# Patient Record
Sex: FEMALE | Race: WHITE | NOT HISPANIC OR LATINO | Employment: FULL TIME | URBAN - METROPOLITAN AREA
[De-identification: names, ages, dates, MRNs, and addresses within clinical notes are randomized per-mention and may not be internally consistent; named-entity substitution may affect disease eponyms.]

---

## 2017-07-11 ENCOUNTER — OFFICE VISIT (OUTPATIENT)
Dept: OBGYN | Facility: CLINIC | Age: 52
End: 2017-07-11
Payer: COMMERCIAL

## 2017-07-11 VITALS — SYSTOLIC BLOOD PRESSURE: 118 MMHG | WEIGHT: 213 LBS | BODY MASS INDEX: 31.45 KG/M2 | DIASTOLIC BLOOD PRESSURE: 86 MMHG

## 2017-07-11 DIAGNOSIS — R39.15 URGENCY OF URINATION: ICD-10-CM

## 2017-07-11 DIAGNOSIS — R35.0 FREQUENCY OF MICTURITION: ICD-10-CM

## 2017-07-11 DIAGNOSIS — R30.0 DYSURIA: Primary | ICD-10-CM

## 2017-07-11 LAB
ALBUMIN UR-MCNC: NEGATIVE MG/DL
APPEARANCE UR: CLEAR
BILIRUB UR QL STRIP: NEGATIVE
COLOR UR AUTO: YELLOW
GLUCOSE UR STRIP-MCNC: NEGATIVE MG/DL
HGB UR QL STRIP: NEGATIVE
KETONES UR STRIP-MCNC: NEGATIVE MG/DL
LEUKOCYTE ESTERASE UR QL STRIP: NEGATIVE
NITRATE UR QL: NEGATIVE
PH UR STRIP: 6.5 PH (ref 5–7)
RBC #/AREA URNS AUTO: NORMAL /HPF (ref 0–2)
SP GR UR STRIP: 1.01 (ref 1–1.03)
URN SPEC COLLECT METH UR: NORMAL
UROBILINOGEN UR STRIP-ACNC: 0.2 EU/DL (ref 0.2–1)
WBC #/AREA URNS AUTO: NORMAL /HPF (ref 0–2)

## 2017-07-11 PROCEDURE — 87086 URINE CULTURE/COLONY COUNT: CPT | Performed by: OBSTETRICS & GYNECOLOGY

## 2017-07-11 PROCEDURE — 99213 OFFICE O/P EST LOW 20 MIN: CPT | Performed by: OBSTETRICS & GYNECOLOGY

## 2017-07-11 PROCEDURE — 81001 URINALYSIS AUTO W/SCOPE: CPT | Performed by: OBSTETRICS & GYNECOLOGY

## 2017-07-11 RX ORDER — ARIPIPRAZOLE 2 MG/1
TABLET ORAL
Refills: 1 | COMMUNITY
Start: 2016-08-29 | End: 2017-07-11

## 2017-07-11 RX ORDER — LEVOTHYROXINE SODIUM 125 UG/1
TABLET ORAL
Refills: 0 | COMMUNITY
Start: 2017-05-24 | End: 2017-08-14 | Stop reason: DRUGHIGH

## 2017-07-11 RX ORDER — BUPROPION HYDROCHLORIDE 150 MG/1
TABLET ORAL
Refills: 1 | COMMUNITY
Start: 2016-09-20 | End: 2017-07-11

## 2017-07-11 RX ORDER — SERTRALINE HYDROCHLORIDE 100 MG/1
TABLET, FILM COATED ORAL
Refills: 2 | COMMUNITY
Start: 2017-05-21 | End: 2017-07-11

## 2017-07-11 RX ORDER — CIPROFLOXACIN 500 MG/1
500 TABLET, FILM COATED ORAL 2 TIMES DAILY
Qty: 14 TABLET | Refills: 1 | Status: SHIPPED | OUTPATIENT
Start: 2017-07-11 | End: 2017-08-14

## 2017-07-11 RX ORDER — PHENAZOPYRIDINE HYDROCHLORIDE 100 MG/1
200 TABLET, FILM COATED ORAL 3 TIMES DAILY PRN
Qty: 12 TABLET | Refills: 1 | Status: SHIPPED | OUTPATIENT
Start: 2017-07-11 | End: 2017-08-14

## 2017-07-11 NOTE — MR AVS SNAPSHOT
"              After Visit Summary   7/11/2017    Mariann Sullivan    MRN: [id number]           Patient Information     Date Of Birth          1965        Visit Information        Provider Department      7/11/2017 10:15 AM Viktor Hendrickson MD [facility][1]        Today's Diagnoses     Dysuria    -  1    Urgency of urination        Frequency of micturition           Follow-ups after your visit        Your next 10 appointments already scheduled     Aug 09, 2017  9:30 AM CDT   MA SCREENING DIGITAL BILATERAL with WEMA1   [facility][1] ([facility][1])    [address] [place] [address], [address]  [facility] [id number] [phone]           Do not use any powder, lotion or deodorant under your arms or on your breast. If you do, we will ask you to remove it before your exam.  Wear comfortable, two-piece clothing.  If you have any allergies, tell your care team.  Bring any previous mammograms from other facilities or have them mailed to the breast center. Three-dimensional (3D) mammograms are available at [place] locations in [facility] and [place]. Benefits of 3D mammograms include: - Improved rate of cancer detection - Decreases your chance of having to go back for more tests, which means fewer: - \"False-positive\" results (This means that there is an abnormal area but it isn't cancer.) - Invasive testing procedures, such as a biopsy or surgery - Can provide clearer images of the breast if you have dense breast tissue. 3D mammography is an optional exam that anyone can have with a 2D mammogram. It doesn't replace or take the place of a 2D mammogram. 2D mammograms remain an effective screening test for all women.  Not all insurance companies cover the cost of a 3D mammogram. Check with your insurance.            Aug 09, 2017  9:45 AM CDT   PHYSICAL with Viktor Hendrickson MD   [facility] ([facility]" "[place])    [address] [address]  [place] [id number] [phone]              Who to contact     If you have questions or need follow up information about today's clinic visit or your schedule please contact [facility] [place] directly at [phone].  Normal or non-critical lab and imaging results will be communicated to you by MyChart, letter or phone within 4 business days after the clinic has received the results. If you do not hear from us within 7 days, please contact the clinic through MyChart or phone. If you have a critical or abnormal lab result, we will notify you by phone as soon as possible.  Submit refill requests through [facility] or call your pharmacy and they will forward the refill request to us. Please allow 3 business days for your refill to be completed.          Additional Information About Your Visit        MyChart Information     [facility] lets you send messages to your doctor, view your test results, renew your prescriptions, schedule appointments and more. To sign up, go to [email or web address] . Click on \"Log in\" on the left side of the screen, which will take you to the Welcome page. Then click on \"Sign up Now\" on the right side of the page.     You will be asked to enter the access code listed below, as well as some personal information. Please follow the directions to create your username and password.     Your access code is: [email or web address]  Expires: 10/9/2017 10:55 AM     Your access code will  in 90 days. If you need help or a new code, please call your [place] clinic or [phone].        Care EveryWhere ID     This is your Care EveryWhere ID. This could be used by other organizations to access your [place] medical records  [id number]        Your Vitals Were     Breastfeeding? BMI (Body Mass Index)                No 31.45 kg/m2           Blood Pressure from Last 3 Encounters:   17 118/86   16 124/80   16 126/80    Weight from " Last 3 Encounters:   07/11/17 213 lb (96.6 kg)   08/17/16 211 lb (95.7 kg)   08/05/16 209 lb (94.8 kg)              We Performed the Following     UA with Microscopic     Urine Culture Aerobic Bacterial          Today's Medication Changes          These changes are accurate as of: 7/11/17 11:05 AM.  If you have any questions, ask your nurse or doctor.               Start taking these medicines.        Dose/Directions    ciprofloxacin 500 MG tablet   Commonly known as:  CIPRO   Used for:  Dysuria, Urgency of urination, Frequency of micturition   Started by:  Viktor Hendrickson MD        Dose:  500 mg   Take 1 tablet (500 mg) by mouth 2 times daily   Quantity:  14 tablet   Refills:  1       phenazopyridine 100 MG tablet   Commonly known as:  PYRIDIUM   Used for:  Dysuria   Started by:  Viktor Hendrickson MD        Dose:  200 mg   Take 2 tablets (200 mg) by mouth 3 times daily as needed   Quantity:  12 tablet   Refills:  1            Where to get your medicines      These medications were sent to [facility] [address] [place], [place] [id number] [employer] AT [facility] [address] & [facility]  [address] [employer] [address]     Phone:  [phone]     ciprofloxacin 500 MG tablet    phenazopyridine 100 MG tablet                Primary Care Provider Office Phone # Fax #    Liza Alvarez [phone]       [facility] [address]  [facility] [address]        Equal Access to Services     [facility]: Hadii agustín braro Sotommy, waaxda luqadaha, qaybta kaalmada adeegyada, shan giang. So [facility] [phone].    ATENCIÓN: Si habla salvador, tiene a shipley disposición servicios gratuitos de asistencia lingüística. Anastacio al [phone].    We comply with applicable federal civil rights laws and [place] laws. We do not discriminate on the basis of race, color, national origin, age, disability sex, sexual orientation or gender  identity.            Thank you!     Thank you for choosing [facility]  for your care. Our goal is always to provide you with excellent care. Hearing back from our patients is one way we can continue to improve our services. Please take a few minutes to complete the written survey that you may receive in the mail after your visit with us. Thank you!             Your Updated Medication List - Protect others around you: Learn how to safely use, store and throw away your medicines at www.disposemymeds.org.          This list is accurate as of: 7/11/17 11:05 AM.  Always use your most recent med list.                   Brand Name Dispense Instructions for use Diagnosis    ciprofloxacin 500 MG tablet    CIPRO    14 tablet    Take 1 tablet (500 mg) by mouth 2 times daily    Dysuria, Urgency of urination, Frequency of micturition       lamoTRIgine 150 MG tablet    LAMICTAL    90 tablet    Take 1 tablet (150 mg) by mouth daily    Depression with anxiety       levothyroxine 125 MCG tablet    SYNTHROID/LEVOTHROID     TK 1 T PO B SAVI        LORazepam 0.5 MG tablet    ATIVAN          phenazopyridine 100 MG tablet    PYRIDIUM    12 tablet    Take 2 tablets (200 mg) by mouth 3 times daily as needed    Dysuria

## 2017-07-11 NOTE — PROGRESS NOTES
SUBJECTIVE:                                                   Mariann Sullivan is a 52 year old female who presents to clinic today for the following health issue(s):  Patient presents with:  Urinary Problem      HPI:  Patient complains of severe urgency and frequency for the last several weeks.  Feels similar to urinary tract infection she said the past.  She has slight dysuria.    No LMP recorded. Patient has had a hysterectomy..   Patient is not sexually active, .  Using hysterectomy for contraception.    reports that she has never smoked. She has never used smokeless tobacco.    STD testing offered?  Declined    Health maintenance updated:  yes    Today's PHQ-2 Score:   PHQ-2 (  [employer]) 2015   Q1: Little interest or pleasure in doing things 1   Q2: Feeling down, depressed or hopeless 1   PHQ-2 Score 2     Today's PHQ-9 Score:   PHQ-9 SCORE 2016   Total Score 9     Today's GHANSHYAM-7 Score:   GHANSHYAM-7 SCORE 2016   Total Score 3       Problem list and histories reviewed & adjusted, as indicated.  Additional history: as documented.    Patient Active Problem List   Diagnosis     Hypothyroidism     Past Surgical History:   Procedure Laterality Date     C LAPAROSCOPIC SUPRACERVICAL HYSTERECTOMY (SUBTOTAL HYSTERECTOMY), WITH OR  age 32    subtotal hysterectomy     HC THYROID LOBECTOMY,UNILAT  age 37      Social History   Substance Use Topics     Smoking status: Never Smoker     Smokeless tobacco: Never Used     Alcohol use 6.0 oz/week      Comment: wine      Problem (# of Occurrences) Relation (Name,Age of Onset)    Cardiovascular (1) Mother: CHF    DIABETES (1) Father: type 2    Hypertension (1) Mother    Neurologic Disorder (1) Father: Parkinson            Current Outpatient Prescriptions   Medication Sig     levothyroxine (SYNTHROID/LEVOTHROID) 125 MCG tablet TK 1 T PO B SAVI     ciprofloxacin (CIPRO) 500 MG tablet Take 1 tablet (500 mg) by mouth 2 times daily     phenazopyridine (PYRIDIUM) 100  MG tablet Take 2 tablets (200 mg) by mouth 3 times daily as needed     lamoTRIgine (LAMICTAL) 150 MG tablet Take 1 tablet (150 mg) by mouth daily     LORazepam (ATIVAN) 0.5 MG tablet      [DISCONTINUED] levothyroxine (SYNTHROID) 175 MCG tablet Take 1 tablet (175 mcg) by mouth daily     No current facility-administered medications for this visit.      Allergies   Allergen Reactions     Sulfa Drugs        ROS:  12 point review of systems negative other than symptoms noted below.  Constitutional: Loss of Appetite and Weight Loss  Cardiovascular: Lower Extremity Swelling and Palpitations  Respiratory: Cough  Gastrointestinal: Abdominal Pain, Bloating, Nausea and Vomiting  Genitourinary: Hot Flashes, Incontinence, Night Sweats, Pelvic Pain and Urgency  Endocrine: Cold Intolerance and Loss of Hair  Psychiatric: Anxiety and Depression    OBJECTIVE:     /86  Wt 213 lb (96.6 kg)  Breastfeeding? No  BMI 31.45 kg/m2  Body mass index is 31.45 kg/(m^2).    Exam:  Constitutional:  Appearance: Well nourished, well developed alert, in no acute distress  Gastrointestinal:  Abdominal Examination:  Abdomen nontender to palpation, tone normal without rigidity or guarding, no masses present, umbilicus without lesions; Liver/Spleen:  No hepatomegaly present, liver nontender to palpation; Hernias:  No hernias present  Lymphatic: Lymph Nodes:  No other lymphadenopathy present  Skin:General Inspection:  No rashes present, no lesions present, no areas of discoloration; Genitalia and Groin:  No rashes present, no lesions present, no areas of discoloration, no masses present.  Neurologic/Psychiatric:  Mental Status:  Oriented X3   Pelvic Exam:  External Genitalia:     Normal appearance for age, no discharge present, no tenderness present, no inflammatory lesions present, color normal  Vagina:     Normal vaginal vault without central or paravaginal defects, no discharge present, no inflammatory lesions present, no masses  present  Bladder:     Tender to palpation  Perineum:     Perineum within normal limits, no evidence of trauma, no rashes or skin lesions present  Anus:     Anus within normal limits, no hemorrhoids present  Inguinal Lymph Nodes:     No lymphadenopathy present  Pubic Hair:     Normal pubic hair distribution for age  Genitalia and Groin:     No rashes present, no lesions present, no areas of discoloration, no masses present     In-Clinic Test Results:  Results for orders placed or performed in visit on 07/11/17 (from the past 24 hour(s))   UA with Microscopic   Result Value Ref Range    Color Urine Yellow     Appearance Urine Clear     Glucose Urine Negative NEG mg/dL    Bilirubin Urine Negative NEG    Ketones Urine Negative NEG mg/dL    Specific Gravity Urine 1.010 1.003 - 1.035    pH Urine 6.5 5.0 - 7.0 pH    Protein Albumin Urine Negative NEG mg/dL    Urobilinogen Urine 0.2 0.2 - 1.0 EU/dL    Nitrite Urine Negative NEG    Blood Urine Negative NEG    Leukocyte Esterase Urine Negative NEG    Source Midstream Urine     WBC Urine O - 2 0 - 2 /HPF    RBC Urine O - 2 0 - 2 /HPF       ASSESSMENT/PLAN:                                                        ICD-10-CM    1. Dysuria R30.0 UA with Microscopic     Urine Culture Aerobic Bacterial     ciprofloxacin (CIPRO) 500 MG tablet     phenazopyridine (PYRIDIUM) 100 MG tablet   2. Urgency of urination R39.15 ciprofloxacin (CIPRO) 500 MG tablet   3. Frequency of micturition R35.0 ciprofloxacin (CIPRO) 500 MG tablet           Plan: The patient is not improved in 48-72 hours she will contact clinic.  We will notify her so the results of her urine culture.    Viktor Hendrickson MD  [facility]

## 2017-07-12 LAB
BACTERIA SPEC CULT: NORMAL
Lab: NORMAL
MICRO REPORT STATUS: NORMAL
SPECIMEN SOURCE: NORMAL

## 2017-08-14 ENCOUNTER — RADIANT APPOINTMENT (OUTPATIENT)
Dept: MAMMOGRAPHY | Facility: CLINIC | Age: 52
End: 2017-08-14
Attending: OBSTETRICS & GYNECOLOGY
Payer: COMMERCIAL

## 2017-08-14 ENCOUNTER — OFFICE VISIT (OUTPATIENT)
Dept: OBGYN | Facility: CLINIC | Age: 52
End: 2017-08-14
Attending: OBSTETRICS & GYNECOLOGY
Payer: COMMERCIAL

## 2017-08-14 VITALS
DIASTOLIC BLOOD PRESSURE: 76 MMHG | BODY MASS INDEX: 31.84 KG/M2 | WEIGHT: 215 LBS | HEIGHT: 69 IN | SYSTOLIC BLOOD PRESSURE: 110 MMHG

## 2017-08-14 DIAGNOSIS — N39.46 MIXED INCONTINENCE URGE AND STRESS: ICD-10-CM

## 2017-08-14 DIAGNOSIS — Z01.419 ENCOUNTER FOR GYNECOLOGICAL EXAMINATION WITHOUT ABNORMAL FINDING: Primary | ICD-10-CM

## 2017-08-14 DIAGNOSIS — N95.1 SYMPTOMS, SUCH AS FLUSHING, SLEEPLESSNESS, HEADACHE, LACK OF CONCENTRATION, ASSOCIATED WITH THE MENOPAUSE: ICD-10-CM

## 2017-08-14 DIAGNOSIS — Z11.51 SCREENING FOR HUMAN PAPILLOMAVIRUS: ICD-10-CM

## 2017-08-14 DIAGNOSIS — Z12.31 VISIT FOR SCREENING MAMMOGRAM: ICD-10-CM

## 2017-08-14 DIAGNOSIS — T14.8XXA BRUISING: ICD-10-CM

## 2017-08-14 LAB
BASOPHILS # BLD AUTO: 0 10E9/L (ref 0–0.2)
BASOPHILS NFR BLD AUTO: 0.3 %
DIFFERENTIAL METHOD BLD: NORMAL
EOSINOPHIL # BLD AUTO: 0.1 10E9/L (ref 0–0.7)
EOSINOPHIL NFR BLD AUTO: 1.3 %
ERYTHROCYTE [DISTWIDTH] IN BLOOD BY AUTOMATED COUNT: 14.2 % (ref 10–15)
HCT VFR BLD AUTO: 39.7 % (ref 35–47)
HGB BLD-MCNC: 13.2 G/DL (ref 11.7–15.7)
LYMPHOCYTES # BLD AUTO: 1.9 10E9/L (ref 0.8–5.3)
LYMPHOCYTES NFR BLD AUTO: 25.5 %
MCH RBC QN AUTO: 28.9 PG (ref 26.5–33)
MCHC RBC AUTO-ENTMCNC: 33.2 G/DL (ref 31.5–36.5)
MCV RBC AUTO: 87 FL (ref 78–100)
MONOCYTES # BLD AUTO: 0.4 10E9/L (ref 0–1.3)
MONOCYTES NFR BLD AUTO: 4.9 %
NEUTROPHILS # BLD AUTO: 5.1 10E9/L (ref 1.6–8.3)
NEUTROPHILS NFR BLD AUTO: 68 %
PLATELET # BLD AUTO: 233 10E9/L (ref 150–450)
RBC # BLD AUTO: 4.56 10E12/L (ref 3.8–5.2)
WBC # BLD AUTO: 7.5 10E9/L (ref 4–11)

## 2017-08-14 PROCEDURE — 82670 ASSAY OF TOTAL ESTRADIOL: CPT | Performed by: OBSTETRICS & GYNECOLOGY

## 2017-08-14 PROCEDURE — 99396 PREV VISIT EST AGE 40-64: CPT | Performed by: OBSTETRICS & GYNECOLOGY

## 2017-08-14 PROCEDURE — G0145 SCR C/V CYTO,THINLAYER,RESCR: HCPCS | Performed by: OBSTETRICS & GYNECOLOGY

## 2017-08-14 PROCEDURE — 83001 ASSAY OF GONADOTROPIN (FSH): CPT | Performed by: OBSTETRICS & GYNECOLOGY

## 2017-08-14 PROCEDURE — 36415 COLL VENOUS BLD VENIPUNCTURE: CPT | Performed by: OBSTETRICS & GYNECOLOGY

## 2017-08-14 PROCEDURE — 87624 HPV HI-RISK TYP POOLED RSLT: CPT | Performed by: OBSTETRICS & GYNECOLOGY

## 2017-08-14 PROCEDURE — 85025 COMPLETE CBC W/AUTO DIFF WBC: CPT | Performed by: OBSTETRICS & GYNECOLOGY

## 2017-08-14 PROCEDURE — G0202 SCR MAMMO BI INCL CAD: HCPCS | Mod: TC

## 2017-08-14 RX ORDER — LEVOTHYROXINE SODIUM 100 UG/1
100 CAPSULE ORAL
COMMUNITY
End: 2020-01-20

## 2017-08-14 ASSESSMENT — PATIENT HEALTH QUESTIONNAIRE - PHQ9
SUM OF ALL RESPONSES TO PHQ QUESTIONS 1-9: 7
5. POOR APPETITE OR OVEREATING: SEVERAL DAYS

## 2017-08-14 ASSESSMENT — ANXIETY QUESTIONNAIRES
3. WORRYING TOO MUCH ABOUT DIFFERENT THINGS: SEVERAL DAYS
IF YOU CHECKED OFF ANY PROBLEMS ON THIS QUESTIONNAIRE, HOW DIFFICULT HAVE THESE PROBLEMS MADE IT FOR YOU TO DO YOUR WORK, TAKE CARE OF THINGS AT HOME, OR GET ALONG WITH OTHER PEOPLE: SOMEWHAT DIFFICULT
1. FEELING NERVOUS, ANXIOUS, OR ON EDGE: SEVERAL DAYS
GAD7 TOTAL SCORE: 7
5. BEING SO RESTLESS THAT IT IS HARD TO SIT STILL: SEVERAL DAYS
6. BECOMING EASILY ANNOYED OR IRRITABLE: SEVERAL DAYS
2. NOT BEING ABLE TO STOP OR CONTROL WORRYING: SEVERAL DAYS
7. FEELING AFRAID AS IF SOMETHING AWFUL MIGHT HAPPEN: SEVERAL DAYS

## 2017-08-14 NOTE — PROGRESS NOTES
Mariann is a 52 year old  female who presents for annual exam.     Besides routine health maintenance,  she would like to discuss bruising on legs.    HPI:  Patient seen for her annual exam.  She is not having periods and is wondering if she may be in menopause.  She is having more hot flashes and night sweats.  She also notices some incontinence.  She has urgency as well as incontinence with exercise.  She has to wear a pad.  She also has easy bruising over her arms and legs.  She stopped her Lamictal medication.  She has had no other evaluation.    GYNECOLOGIC HISTORY:    No LMP recorded. Patient has had a hysterectomy. reports that she has never smoked. She has never used smokeless tobacco.  Patient is not sexually active.  STD testing offered?  Declined    Last PHQ-9 score on record=   PHQ-9 SCORE 2017   Total Score 7         HEALTH MAINTENANCE:  Cholesterol:   Cholesterol   Date Value Ref Range Status   2014 149 mg/dL Final   2011 149 mg/dL Final   Last Mammo: 16, Result: normal, Next Mammo: today   Pap:   Lab Results   Component Value Date    PAP NIL 2016    PAP NIL 2015   Colonoscopy: 2010, normal, repeat 10 years  Health maintenance updated:  yes    HISTORY:  Obstetric History       T0      L0     SAB0   TAB0   Ectopic0   Multiple0   Live Births0       # Outcome Date GA Lbr Brando/2nd Weight Sex Delivery Anes PTL Lv   2 Para            1 Para                 Past Medical History:   Diagnosis Date     Anxiety      Depressive disorder      Thyroid disease      Past Surgical History:   Procedure Laterality Date     C LAPAROSCOPIC SUPRACERVICAL HYSTERECTOMY (SUBTOTAL HYSTERECTOMY), WITH OR  age 32    subtotal hysterectomy     HC THYROID LOBECTOMY,UNILAT  age 37     Family History   Problem Relation Age of Onset     DIABETES Father      type 2     Neurologic Disorder Father      Parkinson     Hypertension Mother      Cardiovascular Mother      CHF     Social  "History     Social History     Marital status:      Spouse name: N/A     Number of children: 2     Years of education: N/A     Occupational History      [employer]     Social History Main Topics     Smoking status: Never Smoker     Smokeless tobacco: Never Used     Alcohol use 6.0 oz/week      Comment: wine     Drug use: No     Sexual activity: Yes     Partners: Male     Birth control/ protection: Female Surgical      Comment: hysterectomy     Other Topics Concern      Service No     Blood Transfusions No     Caffeine Concern Yes     2 cups coffee/ 1 can pop     Occupational Exposure Yes     keyboarding     Hobby Hazards No     Sleep Concern Yes     restless     Weight Concern Yes     Special Diet Yes     Exercise Yes     Seat Belt Yes     Self-Exams Yes     Social History Narrative       Current Outpatient Prescriptions:      Levothyroxine Sodium 100 MCG CAPS, Take 100 mcg by mouth, Disp: , Rfl:      Cholecalciferol (VITAMIN D PO), , Disp: , Rfl:      Multiple Vitamins-Minerals (MULTIVITAMIN PO), , Disp: , Rfl:      LORazepam (ATIVAN) 0.5 MG tablet, as needed , Disp: , Rfl: 5     [DISCONTINUED] levothyroxine (SYNTHROID/LEVOTHROID) 125 MCG tablet, TK 1 T PO B SAVI, Disp: , Rfl: 0  Allergies   Allergen Reactions     Sulfa Drugs        Past medical, surgical, social and family history were reviewed and updated in EPIC.    ROS:   12 point review of systems negative other than symptoms noted below.  Constitutional: Fatigue  Head: Nasal Congestion  Cardiovascular: Lower Extremity Swelling  Gastrointestinal: Abdominal Pain and Bloating  Genitourinary: Night Sweats and Urgency  Skin: Skin Dryness  Endocrine: Cold Intolerance and Loss of Hair  Psychiatric: Anxiety and Depression    EXAM:  /76  Ht 5' 9\" (1.753 m)  Wt 215 lb (97.5 kg)  BMI 31.75 kg/m2   BMI: Body mass index is 31.75 kg/(m^2).    EXAM:  Constitutional: Appearance: Well nourished, well developed alert, in no acute " distress  Neck:  Lymph Nodes:  No lymphadenopathy present    Thyroid:  Gland size normal, nontender, no nodules or masses present  on palpation  Chest:  Respiratory Effort:  Breathing unlabored  Cardiovascular:Heart    Auscultation:  Regular rate, normal rhythm, no murmurs present  Breasts: Inspection of Breasts:  No lymphadenopathy present., Palpation of Breasts and Axillae:  No masses present on palpation, no breast tenderness., Axillary Lymph Nodes:  No lymphadenopathy present. and No nodularity, asymmetry or nipple discharge bilaterally.  Gastrointestinal:  Abdominal Examination:  Abdomen nontender to palpation, tone normal without     rigidity or guarding, no masses present, umbilicus without lesions    Liver and speen:  No hepatomegaly present, liver nontender to palpation    Hernias:  No hernias present  Lymphatic: Lymph Nodes:  No other lymphadenopathy present  Skin:  General Inspection:  No rashes present, no lesions present, no areas of  discoloration.    Genitalia and Groin:  No rashes present, no lesions present, no areas of  discoloration, no masses present  Neurologic/Psychiatric:    Mental Status:  Oriented X3     Pelvic Exam:  External Genitalia:     Normal appearance for age, no discharge present, no tenderness present, no inflammatory lesions present, color normal  Vagina:     Normal vaginal vault without central or paravaginal defects, no discharge present, no inflammatory lesions present, no masses present  Bladder:     Nontender to palpation  Urethra:   Urethral Body:  Urethra palpation normal, urethra structural support normal   Urethral Meatus:  No erythema or lesions present  Cervix:     Appearance healthy, no lesions present, nontender to palpation, no bleeding present  Uterus:     Surgically absent  Adnexa:     No adnexal tenderness present, no adnexal masses present  Perineum:     Perineum within normal limits, no evidence of trauma, no rashes or skin lesions present  Anus:     Anus within  normal limits, no hemorrhoids present  Inguinal Lymph Nodes:     No lymphadenopathy present  Pubic Hair:     Normal pubic hair distribution for age  Genitalia and Groin:     No rashes present, no lesions present, no areas of discoloration, no masses present      COUNSELING:   Reviewed preventive health counseling, as reflected in patient instructions       Regular exercise       Healthy diet/nutrition    Body mass index is 31.75 kg/(m^2).  Weight management plan: Patient was referred to their PCP to discuss a diet and exercise plan.    ASSESSMENT:  52 year old female with satisfactory annual exam.    ICD-10-CM    1. Encounter for gynecological examination without abnormal finding Z01.419 Pap imaged thin layer screen reflex to HPV if ASCUS - recommended age 25 - 29 years   2. Bruising T14.8 CBC with platelets differential   3. Mixed incontinence urge and stress N39.46 CYSTOMETROGRAM COMPLEX W VOID & URETH PRESS PROFILE     CYSTOURETHROSCOPY   4. Symptoms, such as flushing, sleeplessness, headache, lack of concentration, associated with the menopause N95.1 Follicle stimulating hormone     Estradiol           Plan: The patient be notified as results of her lab tests.  She would definitely be a candidate for estrogen replacement and possible also a sling procedure.      Viktor Hendrickson MD

## 2017-08-14 NOTE — MR AVS SNAPSHOT
"              After Visit Summary   8/14/2017    Mariann Sullivan    MRN: [id number]           Patient Information     Date Of Birth          1965        Visit Information        Provider Department      8/14/2017 3:30 PM Viktor Hendrickson MD [facility]        Today's Diagnoses     Encounter for gynecological examination without abnormal finding    -  1    Bruising        Mixed incontinence urge and stress        Symptoms, such as flushing, sleeplessness, headache, lack of concentration, associated with the menopause           Follow-ups after your visit        Who to contact     If you have questions or need follow up information about today's clinic visit or your schedule please contact [facility] directly at [phone].  Normal or non-critical lab and imaging results will be communicated to you by MyChart, letter or phone within 4 business days after the clinic has received the results. If you do not hear from us within 7 days, please contact the clinic through [employer] or phone. If you have a critical or abnormal lab result, we will notify you by phone as soon as possible.  Submit refill requests through Ygle or call your pharmacy and they will forward the refill request to us. Please allow 3 business days for your refill to be completed.          Additional Information About Your Visit        MyChart Information     Ygle lets you send messages to your doctor, view your test results, renew your prescriptions, schedule appointments and more. To sign up, go to [email or web address] . Click on \"Log in\" on the left side of the screen, which will take you to the Welcome page. Then click on \"Sign up Now\" on the right side of the page.     You will be asked to enter the access code listed below, as well as some personal information. Please follow the directions to create your username and password.     Your access code is: [email or web address]  Expires: 10/9/2017 " "10:55 AM     Your access code will  in 90 days. If you need help or a new code, please call your [facility] clinic or [phone].        Care EveryWhere ID     This is your Care EveryWhere ID. This could be used by other organizations to access your [facility] medical records  [id number]        Your Vitals Were     Height BMI (Body Mass Index)                5' 9\" (1.753 m) 31.75 kg/m2           Blood Pressure from Last 3 Encounters:   17 110/76   17 118/86   16 124/80    Weight from Last 3 Encounters:   17 215 lb (97.5 kg)   17 213 lb (96.6 kg)   16 211 lb (95.7 kg)              We Performed the Following     CBC with platelets differential     CYSTOMETROGRAM COMPLEX W VOID & URETH PRESS PROFILE     CYSTOURETHROSCOPY     Estradiol     Follicle stimulating hormone     Pap imaged thin layer screen reflex to HPV if ASCUS - recommended age 25 - 29 years          Today's Medication Changes          These changes are accurate as of: 17  4:25 PM.  If you have any questions, ask your nurse or doctor.               These medicines have changed or have updated prescriptions.        Dose/Directions    Levothyroxine Sodium 100 MCG Caps   This may have changed:  Another medication with the same name was removed. Continue taking this medication, and follow the directions you see here.   Changed by:  Viktor Hendrickson MD        Dose:  100 mcg   Take 100 mcg by mouth   Refills:  0                Primary Care Provider Office Phone # Fax #    Liza Alvarez [phone]       [facility] [address] [address]        Equal Access to Services     [facility]: Hadii agustín chavez hadasho Soomaali, waaxda luqadaha, qaybta kaalmada andreegyada, shan giang. So [facility] [phone].    ATENCIÓN: Si habla español, tiene a shipley disposición servicios gratuitos de asistencia lingüística. Llame al [phone].    We comply with applicable " federal civil rights laws and [place] laws. We do not discriminate on the basis of race, color, national origin, age, disability sex, sexual orientation or gender identity.            Thank you!     Thank you for choosing [facility]  for your care. Our goal is always to provide you with excellent care. Hearing back from our patients is one way we can continue to improve our services. Please take a few minutes to complete the written survey that you may receive in the mail after your visit with us. Thank you!             Your Updated Medication List - Protect others around you: Learn how to safely use, store and throw away your medicines at www.disposemymeds.org.          This list is accurate as of: 8/14/17  4:25 PM.  Always use your most recent med list.                   Brand Name Dispense Instructions for use Diagnosis    Levothyroxine Sodium 100 MCG Caps      Take 100 mcg by mouth        LORazepam 0.5 MG tablet    ATIVAN     as needed        MULTIVITAMIN PO           VITAMIN D PO

## 2017-08-14 NOTE — LETTER
August 26, 2017    [facility] [address]    Dear Mariann,  We are happy to inform you that your PAP smear result from 8/14/17 is normal.  We are now able to do a follow up test on PAP smears. The DNA test is for HPV (Human Papilloma Virus). Cervical cancer is closely linked with certain types of HPV. Your result showed no evidence of high risk HPV.  Therefore we recommend you return in 3 years for your next pap smear and HPV test.  You will still need to return to the clinic every year for an annual exam and other preventive tests.  Please contact the clinic at [phone] with any questions.  Sincerely,    Viktor Hendrickson MD/deidra

## 2017-08-15 LAB
ESTRADIOL SERPL-MCNC: 20 PG/ML
FSH SERPL-ACNC: 79.2 IU/L

## 2017-08-15 ASSESSMENT — ANXIETY QUESTIONNAIRES: GAD7 TOTAL SCORE: 7

## 2017-08-17 LAB
COPATH REPORT: NORMAL
PAP: NORMAL

## 2017-08-21 ENCOUNTER — APPOINTMENT (OUTPATIENT)
Dept: OBGYN | Facility: CLINIC | Age: 52
End: 2017-08-21
Payer: COMMERCIAL

## 2017-08-21 ENCOUNTER — OFFICE VISIT (OUTPATIENT)
Dept: OBGYN | Facility: CLINIC | Age: 52
End: 2017-08-21
Payer: COMMERCIAL

## 2017-08-21 DIAGNOSIS — N39.46 MIXED STRESS AND URGE URINARY INCONTINENCE: Primary | ICD-10-CM

## 2017-08-21 DIAGNOSIS — N95.1 SYMPTOMS, SUCH AS FLUSHING, SLEEPLESSNESS, HEADACHE, LACK OF CONCENTRATION, ASSOCIATED WITH THE MENOPAUSE: ICD-10-CM

## 2017-08-21 LAB
FINAL DIAGNOSIS: NORMAL
HPV HR 12 DNA CVX QL NAA+PROBE: NEGATIVE
HPV16 DNA SPEC QL NAA+PROBE: NEGATIVE
HPV18 DNA SPEC QL NAA+PROBE: NEGATIVE
SPECIMEN DESCRIPTION: NORMAL

## 2017-08-21 PROCEDURE — 52000 CYSTOURETHROSCOPY: CPT | Performed by: OBSTETRICS & GYNECOLOGY

## 2017-08-21 PROCEDURE — 51729 CYSTOMETROGRAM W/VP&UP: CPT | Performed by: OBSTETRICS & GYNECOLOGY

## 2017-08-21 RX ORDER — NORETHINDRONE ACETATE AND ETHINYL ESTRADIOL 1; 5 MG/1; UG/1
1 TABLET ORAL DAILY
Qty: 90 TABLET | Refills: 3 | Status: SHIPPED | OUTPATIENT
Start: 2017-08-21 | End: 2018-12-24

## 2017-08-21 NOTE — MR AVS SNAPSHOT
"              After Visit Summary   2017    Mariann Sullivan    MRN: [id number]           Patient Information     Date Of Birth          1965        Visit Information        Provider Department      2017 3:00 PM Viktor Hendrickson MD [facility]        Today's Diagnoses     Mixed stress and urge urinary incontinence    -  1    Symptoms, such as flushing, sleeplessness, headache, lack of concentration, associated with the menopause           Follow-ups after your visit        Who to contact     If you have questions or need follow up information about today's clinic visit or your schedule please contact [facility] directly at [phone].  Normal or non-critical lab and imaging results will be communicated to you by MyChart, letter or phone within 4 business days after the clinic has received the results. If you do not hear from us within 7 days, please contact the clinic through MyChart or phone. If you have a critical or abnormal lab result, we will notify you by phone as soon as possible.  Submit refill requests through [employer] or call your pharmacy and they will forward the refill request to us. Please allow 3 business days for your refill to be completed.          Additional Information About Your Visit        MyChart Information     [employer]igm lets you send messages to your doctor, view your test results, renew your prescriptions, schedule appointments and more. To sign up, go to [email or web address] . Click on \"Log in\" on the left side of the screen, which will take you to the Welcome page. Then click on \"Sign up Now\" on the right side of the page.     You will be asked to enter the access code listed below, as well as some personal information. Please follow the directions to create your username and password.     Your access code is: [email or web address]  Expires: 10/9/2017 10:55 AM     Your access code will  in 90 days. If you need help or a new " code, please call your [facility] clinic or [phone].        Care EveryWhere ID     This is your Care EveryWhere ID. This could be used by other organizations to access your [facility] medical records  [id number]         Blood Pressure from Last 3 Encounters:   08/14/17 110/76   07/11/17 118/86   08/17/16 124/80    Weight from Last 3 Encounters:   08/14/17 215 lb (97.5 kg)   07/11/17 213 lb (96.6 kg)   08/17/16 211 lb (95.7 kg)              We Performed the Following     CYSTOMETROGRAM COMPLEX W VOID & URETH PRESS PROFILE     CYSTOURETHROSCOPY          Today's Medication Changes          These changes are accurate as of: 8/21/17  3:30 PM.  If you have any questions, ask your nurse or doctor.               Start taking these medicines.        Dose/Directions    norethindrone-ethinyl estradiol 1-5 MG-MCG per tablet   Commonly known as:  FEMHRT 1/5   Used for:  Symptoms, such as flushing, sleeplessness, headache, lack of concentration, associated with the menopause   Started by:  Viktor Hendrickson MD        Dose:  1 tablet   Take 1 tablet by mouth daily   Quantity:  90 tablet   Refills:  3            Where to get your medicines      These medications were sent to [facility] [address] [place] [address] [facility] AT [place] [address] & [facility]  [address] [facility] [address]     Phone:  [phone]     norethindrone-ethinyl estradiol 1-5 MG-MCG per tablet                Primary Care Provider Office Phone # Fax #    Liza Alvarez [phone] [address] [address]        Equal Access to Services     [facility]: Hadjihan Aguila, ar franco, mimi lopez, shan giang. So [facility] [phone].    ATENCIÓN: Si habla español, tiene a shipley disposición servicios gratuitos de asistencia lingüística. Llame al [phone].    We comply with applicable federal civil rights laws  and [place] laws. We do not discriminate on the basis of race, color, national origin, age, disability sex, sexual orientation or gender identity.            Thank you!     Thank you for choosing [facility]  for your care. Our goal is always to provide you with excellent care. Hearing back from our patients is one way we can continue to improve our services. Please take a few minutes to complete the written survey that you may receive in the mail after your visit with us. Thank you!             Your Updated Medication List - Protect others around you: Learn how to safely use, store and throw away your medicines at www.disposemymeds.org.          This list is accurate as of: 8/21/17  3:30 PM.  Always use your most recent med list.                   Brand Name Dispense Instructions for use Diagnosis    Levothyroxine Sodium 100 MCG Caps      Take 100 mcg by mouth        LORazepam 0.5 MG tablet    ATIVAN     as needed        MULTIVITAMIN PO           norethindrone-ethinyl estradiol 1-5 MG-MCG per tablet    FEMHRT 1/5    90 tablet    Take 1 tablet by mouth daily    Symptoms, such as flushing, sleeplessness, headache, lack of concentration, associated with the menopause       VITAMIN D PO

## 2017-08-21 NOTE — PROGRESS NOTES
See scanned Urodynamics report.    Patient is seen for a cystourethroscopy.  She was recently seen for annual exam.  Patient complained of both stress incontinence and urgency.  She has to wear a pad on a daily basis.  She has just completed her urodynamics which showed a mixed urinary stress and urge incontinence.    Preoperative diagnosis: Mixed urge and stress incontinence.    Postoperative diagnosis: Same    Procedure: Cystourethroscopy.    Findings: The patient had just completed her urodynamics.  Lidocaine gel was in the urethra.  The patient was placed in the lithotomy position.  Using the Olympus flexible cystourethroscope the scope was inserted into the urethra and through the bladder neck.  The bladder was surveyed and appeared normal.  There seemed to be some increased vascularity within the bladder but there was no evidence of pathology.  Ureteral orifices appeared normal.  The cystourethroscope was withdrawn through the bladder neck which closed normally.  The urethra appeared normal.  The patient tolerated the procedure well.    Plan: The patient will be scheduled for a suburethral sling to be done as an outpatient under general anesthesia.  Prior to the surgery the patient will start on her hormone replacement therapy.  She will also make an appointment with internal medicine to investigate the easy bruisability and for a preop exam.    Viktor Hendrickson MD     Amount of time needed for the procedure:  1 hour    Expected time off from work:  2 days  Surgeon:  Viktor Hendrickson MD  Surgical Procedure:  Suburethral sling, cystoscopy  Preop Needed:  Yes with  PCP  Location for surgery to performed:   Surgery Outpatient  Anesthesia:  General     Allergies   Allergen Reactions     Sulfa Drugs        DIAGNOSIS:  Mixed urge and stress urinary incontinence    Special Instructions:

## 2017-08-23 ENCOUNTER — TELEPHONE (OUTPATIENT)
Dept: OBGYN | Facility: CLINIC | Age: 52
End: 2017-08-23

## 2017-08-23 NOTE — TELEPHONE ENCOUNTER
[facility] to call re SX Chari Lemus  Surgery Scheduler      Amount of time needed for the procedure:  1 hour                                            Expected time off from work:  2 days  Surgeon:  Viktor Hendrickson MD  Surgical Procedure:  Suburethral sling, cystoscopy  Preop Needed:  Yes with  PCP  Location for surgery to performed:   Surgery Outpatient  Anesthesia:  General           Allergies   Allergen Reactions     Sulfa Drugs           DIAGNOSIS:  Mixed urge and stress urinary incontinence     Special Instructions:

## 2017-08-23 NOTE — TELEPHONE ENCOUNTER
Spoke to patient. Advised this is a 2 day recovery. Dr Hendrickson operates any day of the week with his dedicated SX days to be Mondays. His first available Monday would be 9/18/2017. Any other day is dependent on the hospital. Pt stated she would look at her calendar and get back to me. Advised if she does get my vm to leave a couple dates on there, I will call the hospital and then give her a call back. Pt understood    Julita Lemus  Surgery Scheduler

## 2018-12-20 NOTE — PROGRESS NOTES
Mariann is a 53 year old  female who presents for annual exam.     Besides routine health maintenance,  she would like to discuss fatigue and urinary urgency.    HPI:  The patient's PCP is Liza Alvarez MD.    Complains of chronic fatigue.  It has been getting much worse over the past several months.  She also continues to have mixed urinary stress incontinence symptoms.  We had talked about a sling procedure at her last visit.  She has not had time to follow through.      GYNECOLOGIC HISTORY:    No LMP recorded. Patient has had a hysterectomy.  Her current contraception method is: hysterectomy.  She  reports that  has never smoked. she has never used smokeless tobacco.    Patient is not sexually active.  STD testing offered?  Declined  Last PHQ-9 score on record =   PHQ-9 SCORE 2018   PHQ-9 Total Score 7     Last GAD7 score on record =   GHANSHYAM-7 SCORE 2018   Total Score 4     Alcohol Score = 0    HEALTH MAINTENANCE:  Cholesterol:   Cholesterol   Date Value Ref Range Status   2014 149 mg/dL Final   2011 149 mg/dL Final   14    Total= 149, Triglycerides=73, HDL=49, LDL=85  Last Mammo: one year ago, Result: normal, Next Mammo: today   Pap:   Lab Results   Component Value Date    PAP NIL 2017    PAP NIL 2016    PAP NIL 2015 WNL HPV (-)neg  Colonoscopy:  12/17/10, Result: normal, Next Colonoscopy: 2 years.  Dexa:  2016    Health maintenance updated:  yes    HISTORY:  Obstetric History       T2      L2     SAB0   TAB0   Ectopic0   Multiple0   Live Births2       # Outcome Date GA Lbr Brando/2nd Weight Sex Delivery Anes PTL Lv   2 Term         IRINA   1 Term         IRINA          Patient Active Problem List   Diagnosis     Hypothyroidism     Past Surgical History:   Procedure Laterality Date     C LAPAROSCOPIC SUPRACERVICAL HYSTERECTOMY (SUBTOTAL HYSTERECTOMY), WITH OR  age 32    subtotal hysterectomy     HC THYROID LOBECTOMY,UNILAT  age 37     "  Social History     Tobacco Use     Smoking status: Never Smoker     Smokeless tobacco: Never Used   Substance Use Topics     Alcohol use: Yes     Alcohol/week: 6.0 oz     Comment: wine      Problem (# of Occurrences) Relation (Name,Age of Onset)    Cardiovascular (1) Mother: CHF    Diabetes (1) Father: type 2    Hypertension (1) Mother    Neurologic Disorder (1) Father: Parkinson            Current Outpatient Medications   Medication Sig     Cholecalciferol (VITAMIN D PO)      Levothyroxine Sodium 100 MCG CAPS Take 100 mcg by mouth     Multiple Vitamins-Minerals (MULTIVITAMIN PO)      No current facility-administered medications for this visit.      Allergies   Allergen Reactions     Sulfa Drugs        Past medical, surgical, social and family histories were reviewed and updated in EPIC.    ROS:   12 point review of systems negative other than symptoms noted below.  Constitutional: Fatigue  Gastrointestinal: Abdominal Pain and Bloating  Genitourinary: Urgency  Skin: Skin Dryness  Endocrine: Cold Intolerance and Loss of Hair  Psychiatric: Depression and Difficulty Sleeping    EXAM:  /80   Ht 1.765 m (5' 9.5\")   Wt 104.3 kg (230 lb)   Breastfeeding? No   BMI 33.48 kg/m     BMI: Body mass index is 33.48 kg/m .    PHYSICAL EXAM:  Constitutional:  Appearance: Well nourished, well developed, alert, in no acute distress  Neck:  Lymph Nodes:  No lymphadenopathy present    Thyroid:  Gland size normal, nontender, no nodules or masses present  on palpation  Chest:  Respiratory Effort:  Breathing unlabored  Cardiovascular:    Heart: Auscultation:  Regular rate, normal rhythm, no murmurs present  Breasts: Inspection of Breasts:  No lymphadenopathy present., Palpation of Breasts and Axillae:  No masses present on palpation, no breast tenderness., Axillary Lymph Nodes:  No lymphadenopathy present. and No nodularity, asymmetry or nipple discharge bilaterally.  Gastrointestinal:   Abdominal Examination:  Abdomen " nontender to palpation, tone normal without rigidity or guarding, no masses present, umbilicus without lesions   Liver and Spleen:  No hepatomegaly present, liver nontender to palpation    Hernias:  No hernias present  Lymphatic: Lymph Nodes:  No other lymphadenopathy present  Skin:  General Inspection:  No rashes present, no lesions present, no areas of  discoloration    Genitalia and Groin:  No rashes present, no lesions present, no areas of  discoloration, no masses present  Neurologic/Psychiatric:    Mental Status:  Oriented X3     Pelvic Exam:  External Genitalia:     Normal appearance for age, no discharge present, no tenderness present, no inflammatory lesions present, color normal  Vagina:     Normal vaginal vault without central or paravaginal defects, no discharge present, no inflammatory lesions present, no masses present  Bladder:     Nontender to palpation  Urethra:   Urethral Body:  Urethra palpation normal, urethra structural support normal   Urethral Meatus:  No erythema or lesions present  Cervix:     Appearance healthy, no lesions present, nontender to palpation, no bleeding present  Uterus:     Surgically absent  Adnexa:     No adnexal tenderness present, no adnexal masses present  Perineum:     Perineum within normal limits, no evidence of trauma, no rashes or skin lesions present  Anus:     Anus within normal limits, no hemorrhoids present  Inguinal Lymph Nodes:     No lymphadenopathy present  Pubic Hair:     Normal pubic hair distribution for age  Genitalia and Groin:     No rashes present, no lesions present, no areas of discoloration, no masses present      COUNSELING:   Reviewed preventive health counseling, as reflected in patient instructions       Regular exercise       Healthy diet/nutrition    BMI: Body mass index is 33.48 kg/m .  Weight management plan: Patient was referred to their PCP to discuss a diet and exercise plan.    ASSESSMENT:  53 year old female with satisfactory annual  exam.    ICD-10-CM    1. Encounter for gynecological examination without abnormal finding Z01.419    2. Hypothyroidism E03.9    3. Chronic fatigue R53.82 TSH with free T4 reflex     CBC with platelets differential     Comprehensive metabolic panel       PLAN:  Patient will be notified as to the blood results.  She will consider scheduling surgery sometime after the first of the year.    Viktor Hendrickson MD

## 2018-12-24 ENCOUNTER — OFFICE VISIT (OUTPATIENT)
Dept: OBGYN | Facility: CLINIC | Age: 53
End: 2018-12-24
Attending: OBSTETRICS & GYNECOLOGY
Payer: COMMERCIAL

## 2018-12-24 ENCOUNTER — ANCILLARY PROCEDURE (OUTPATIENT)
Dept: MAMMOGRAPHY | Facility: CLINIC | Age: 53
End: 2018-12-24
Attending: OBSTETRICS & GYNECOLOGY
Payer: COMMERCIAL

## 2018-12-24 VITALS
WEIGHT: 230 LBS | SYSTOLIC BLOOD PRESSURE: 120 MMHG | BODY MASS INDEX: 32.93 KG/M2 | DIASTOLIC BLOOD PRESSURE: 80 MMHG | HEIGHT: 70 IN

## 2018-12-24 DIAGNOSIS — Z01.419 ENCOUNTER FOR GYNECOLOGICAL EXAMINATION WITHOUT ABNORMAL FINDING: Primary | ICD-10-CM

## 2018-12-24 DIAGNOSIS — R53.82 CHRONIC FATIGUE: ICD-10-CM

## 2018-12-24 DIAGNOSIS — Z12.31 VISIT FOR SCREENING MAMMOGRAM: ICD-10-CM

## 2018-12-24 LAB
BASOPHILS # BLD AUTO: 0 10E9/L (ref 0–0.2)
BASOPHILS NFR BLD AUTO: 0.5 %
DIFFERENTIAL METHOD BLD: NORMAL
EOSINOPHIL # BLD AUTO: 0.1 10E9/L (ref 0–0.7)
EOSINOPHIL NFR BLD AUTO: 2.4 %
ERYTHROCYTE [DISTWIDTH] IN BLOOD BY AUTOMATED COUNT: 14.5 % (ref 10–15)
HCT VFR BLD AUTO: 39.3 % (ref 35–47)
HGB BLD-MCNC: 12.9 G/DL (ref 11.7–15.7)
LYMPHOCYTES # BLD AUTO: 1.2 10E9/L (ref 0.8–5.3)
LYMPHOCYTES NFR BLD AUTO: 20.6 %
MCH RBC QN AUTO: 28.6 PG (ref 26.5–33)
MCHC RBC AUTO-ENTMCNC: 32.8 G/DL (ref 31.5–36.5)
MCV RBC AUTO: 87 FL (ref 78–100)
MONOCYTES # BLD AUTO: 0.4 10E9/L (ref 0–1.3)
MONOCYTES NFR BLD AUTO: 6.3 %
NEUTROPHILS # BLD AUTO: 4.2 10E9/L (ref 1.6–8.3)
NEUTROPHILS NFR BLD AUTO: 70.2 %
PLATELET # BLD AUTO: 239 10E9/L (ref 150–450)
RBC # BLD AUTO: 4.51 10E12/L (ref 3.8–5.2)
WBC # BLD AUTO: 5.9 10E9/L (ref 4–11)

## 2018-12-24 PROCEDURE — 99396 PREV VISIT EST AGE 40-64: CPT | Performed by: OBSTETRICS & GYNECOLOGY

## 2018-12-24 PROCEDURE — 77067 SCR MAMMO BI INCL CAD: CPT | Mod: TC

## 2018-12-24 PROCEDURE — 85025 COMPLETE CBC W/AUTO DIFF WBC: CPT | Performed by: OBSTETRICS & GYNECOLOGY

## 2018-12-24 PROCEDURE — 84443 ASSAY THYROID STIM HORMONE: CPT | Performed by: OBSTETRICS & GYNECOLOGY

## 2018-12-24 PROCEDURE — 87624 HPV HI-RISK TYP POOLED RSLT: CPT | Performed by: OBSTETRICS & GYNECOLOGY

## 2018-12-24 PROCEDURE — 36415 COLL VENOUS BLD VENIPUNCTURE: CPT | Performed by: OBSTETRICS & GYNECOLOGY

## 2018-12-24 PROCEDURE — 80053 COMPREHEN METABOLIC PANEL: CPT | Performed by: OBSTETRICS & GYNECOLOGY

## 2018-12-24 PROCEDURE — G0145 SCR C/V CYTO,THINLAYER,RESCR: HCPCS | Performed by: OBSTETRICS & GYNECOLOGY

## 2018-12-24 ASSESSMENT — ANXIETY QUESTIONNAIRES
IF YOU CHECKED OFF ANY PROBLEMS ON THIS QUESTIONNAIRE, HOW DIFFICULT HAVE THESE PROBLEMS MADE IT FOR YOU TO DO YOUR WORK, TAKE CARE OF THINGS AT HOME, OR GET ALONG WITH OTHER PEOPLE: SOMEWHAT DIFFICULT
6. BECOMING EASILY ANNOYED OR IRRITABLE: SEVERAL DAYS
5. BEING SO RESTLESS THAT IT IS HARD TO SIT STILL: NOT AT ALL
3. WORRYING TOO MUCH ABOUT DIFFERENT THINGS: SEVERAL DAYS
GAD7 TOTAL SCORE: 4
1. FEELING NERVOUS, ANXIOUS, OR ON EDGE: NOT AT ALL
2. NOT BEING ABLE TO STOP OR CONTROL WORRYING: SEVERAL DAYS
7. FEELING AFRAID AS IF SOMETHING AWFUL MIGHT HAPPEN: SEVERAL DAYS

## 2018-12-24 ASSESSMENT — MIFFLIN-ST. JEOR: SCORE: 1710.58

## 2018-12-24 ASSESSMENT — PATIENT HEALTH QUESTIONNAIRE - PHQ9
5. POOR APPETITE OR OVEREATING: NOT AT ALL
SUM OF ALL RESPONSES TO PHQ QUESTIONS 1-9: 7

## 2018-12-24 NOTE — LETTER
January 3, 2019    [facility] [address]    Dear Mariann,  We are happy to inform you that your PAP smear result from 12/24/18 is normal.  We are now able to do a follow up test on PAP smears. The DNA test is for HPV (Human Papilloma Virus). Cervical cancer is closely linked with certain types of HPV. Your results showed no evidence of high risk HPV.  Therefore we recommend you return in 5 years for your next pap smear and HPV test.  You will still need to return to the clinic every year for an annual exam and other preventive tests.  If you have additional questions regarding this result, please call our registered nurse, Liza at [phone].  Sincerely,    Viktor Hendrickson MD/deidra

## 2018-12-25 LAB
ALBUMIN SERPL-MCNC: 3.6 G/DL (ref 3.4–5)
ALP SERPL-CCNC: 79 U/L (ref 40–150)
ALT SERPL W P-5'-P-CCNC: 38 U/L (ref 0–50)
ANION GAP SERPL CALCULATED.3IONS-SCNC: 8 MMOL/L (ref 3–14)
AST SERPL W P-5'-P-CCNC: 28 U/L (ref 0–45)
BILIRUB SERPL-MCNC: 0.5 MG/DL (ref 0.2–1.3)
BUN SERPL-MCNC: 10 MG/DL (ref 7–30)
CALCIUM SERPL-MCNC: 8.7 MG/DL (ref 8.5–10.1)
CHLORIDE SERPL-SCNC: 105 MMOL/L (ref 94–109)
CO2 SERPL-SCNC: 26 MMOL/L (ref 20–32)
CREAT SERPL-MCNC: 0.82 MG/DL (ref 0.52–1.04)
GFR SERPL CREATININE-BSD FRML MDRD: 80 ML/MIN/{1.73_M2}
GLUCOSE SERPL-MCNC: 84 MG/DL (ref 70–99)
POTASSIUM SERPL-SCNC: 3.8 MMOL/L (ref 3.4–5.3)
PROT SERPL-MCNC: 7.2 G/DL (ref 6.8–8.8)
SODIUM SERPL-SCNC: 139 MMOL/L (ref 133–144)
TSH SERPL DL<=0.005 MIU/L-ACNC: 0.63 MU/L (ref 0.4–4)

## 2018-12-25 ASSESSMENT — ANXIETY QUESTIONNAIRES: GAD7 TOTAL SCORE: 4

## 2018-12-27 LAB
COPATH REPORT: NORMAL
PAP: NORMAL

## 2018-12-31 LAB
FINAL DIAGNOSIS: NORMAL
HPV HR 12 DNA CVX QL NAA+PROBE: NEGATIVE
HPV16 DNA SPEC QL NAA+PROBE: NEGATIVE
HPV18 DNA SPEC QL NAA+PROBE: NEGATIVE
SPECIMEN DESCRIPTION: NORMAL
SPECIMEN SOURCE CVX/VAG CYTO: NORMAL

## 2019-03-08 ENCOUNTER — HOSPITAL ENCOUNTER (OUTPATIENT)
Dept: ULTRASOUND IMAGING | Facility: CLINIC | Age: 54
Discharge: HOME | End: 2019-03-08
Attending: SPECIALIST | Admitting: SPECIALIST
Payer: COMMERCIAL

## 2019-03-08 DIAGNOSIS — E89.0 POSTSURGICAL HYPOTHYROIDISM: ICD-10-CM

## 2019-03-08 DIAGNOSIS — E04.9 GOITER: ICD-10-CM

## 2019-03-08 PROCEDURE — 76536 US EXAM OF HEAD AND NECK: CPT

## 2020-01-20 ENCOUNTER — ANCILLARY PROCEDURE (OUTPATIENT)
Dept: MAMMOGRAPHY | Facility: CLINIC | Age: 55
End: 2020-01-20
Payer: COMMERCIAL

## 2020-01-20 ENCOUNTER — OFFICE VISIT (OUTPATIENT)
Dept: OBGYN | Facility: CLINIC | Age: 55
End: 2020-01-20
Payer: COMMERCIAL

## 2020-01-20 ENCOUNTER — ANCILLARY PROCEDURE (OUTPATIENT)
Dept: BONE DENSITY | Facility: CLINIC | Age: 55
End: 2020-01-20
Attending: NURSE PRACTITIONER
Payer: COMMERCIAL

## 2020-01-20 VITALS
DIASTOLIC BLOOD PRESSURE: 74 MMHG | BODY MASS INDEX: 37.33 KG/M2 | SYSTOLIC BLOOD PRESSURE: 108 MMHG | HEIGHT: 69 IN | WEIGHT: 252 LBS

## 2020-01-20 DIAGNOSIS — Z13.220 ENCOUNTER FOR LIPID SCREENING FOR CARDIOVASCULAR DISEASE: ICD-10-CM

## 2020-01-20 DIAGNOSIS — Z12.31 VISIT FOR SCREENING MAMMOGRAM: ICD-10-CM

## 2020-01-20 DIAGNOSIS — Z01.419 ENCOUNTER FOR GYNECOLOGICAL EXAMINATION WITHOUT ABNORMAL FINDING: Primary | ICD-10-CM

## 2020-01-20 DIAGNOSIS — Z13.228 SCREENING FOR METABOLIC DISORDER: ICD-10-CM

## 2020-01-20 DIAGNOSIS — Z13.6 ENCOUNTER FOR LIPID SCREENING FOR CARDIOVASCULAR DISEASE: ICD-10-CM

## 2020-01-20 DIAGNOSIS — Z13.820 SPECIAL SCREENING FOR OSTEOPOROSIS: ICD-10-CM

## 2020-01-20 PROCEDURE — G0145 SCR C/V CYTO,THINLAYER,RESCR: HCPCS | Performed by: NURSE PRACTITIONER

## 2020-01-20 PROCEDURE — 99396 PREV VISIT EST AGE 40-64: CPT | Performed by: NURSE PRACTITIONER

## 2020-01-20 PROCEDURE — 77067 SCR MAMMO BI INCL CAD: CPT | Mod: TC

## 2020-01-20 PROCEDURE — 87624 HPV HI-RISK TYP POOLED RSLT: CPT | Performed by: NURSE PRACTITIONER

## 2020-01-20 PROCEDURE — 77080 DXA BONE DENSITY AXIAL: CPT | Performed by: OBSTETRICS & GYNECOLOGY

## 2020-01-20 RX ORDER — ESCITALOPRAM OXALATE 10 MG/1
TABLET ORAL
COMMUNITY
Start: 2019-11-13 | End: 2023-10-20

## 2020-01-20 RX ORDER — LORAZEPAM 0.5 MG/1
TABLET ORAL
COMMUNITY
Start: 2020-01-06 | End: 2023-11-01

## 2020-01-20 RX ORDER — LEVOTHYROXINE SODIUM 125 UG/1
TABLET ORAL
COMMUNITY
Start: 2019-11-27 | End: 2021-03-22 | Stop reason: DRUGHIGH

## 2020-01-20 ASSESSMENT — ANXIETY QUESTIONNAIRES
6. BECOMING EASILY ANNOYED OR IRRITABLE: NOT AT ALL
7. FEELING AFRAID AS IF SOMETHING AWFUL MIGHT HAPPEN: NOT AT ALL
GAD7 TOTAL SCORE: 3
1. FEELING NERVOUS, ANXIOUS, OR ON EDGE: SEVERAL DAYS
3. WORRYING TOO MUCH ABOUT DIFFERENT THINGS: SEVERAL DAYS
2. NOT BEING ABLE TO STOP OR CONTROL WORRYING: SEVERAL DAYS
5. BEING SO RESTLESS THAT IT IS HARD TO SIT STILL: NOT AT ALL
IF YOU CHECKED OFF ANY PROBLEMS ON THIS QUESTIONNAIRE, HOW DIFFICULT HAVE THESE PROBLEMS MADE IT FOR YOU TO DO YOUR WORK, TAKE CARE OF THINGS AT HOME, OR GET ALONG WITH OTHER PEOPLE: NOT DIFFICULT AT ALL

## 2020-01-20 ASSESSMENT — PATIENT HEALTH QUESTIONNAIRE - PHQ9
SUM OF ALL RESPONSES TO PHQ QUESTIONS 1-9: 3
5. POOR APPETITE OR OVEREATING: NOT AT ALL

## 2020-01-20 ASSESSMENT — MIFFLIN-ST. JEOR: SCORE: 1797.44

## 2020-01-20 NOTE — PROGRESS NOTES
Mariann is a 54 year old  female who presents for annual exam.     Besides routine health maintenance, she has no other health concerns today .    HPI:here for annual exam. She had a LASH about 20 years ago per patient.  She is menopausal, no HRT.  Has no other concerns today.  Will return fasting for blood work another day.      GYNECOLOGIC HISTORY:    No LMP recorded. Patient has had a hysterectomy.  She  reports that she has never smoked. She has never used smokeless tobacco.  Patient is not sexually active.  STD testing offered?  Declined     Last PHQ-9 score on record =   PHQ-9 SCORE 2020   PHQ-9 Total Score 3     Last GAD7 score on record =   GHANSHYAM-7 SCORE 2020   Total Score 3     Alcohol Score = 0    HEALTH MAINTENANCE:  Cholesterol:   Recent Labs   Lab Test 14   CHOL 149   HDL 49   LDL 85   TRIG 73   A1C 5.7     Last Mammo: 18, Result: Normal, Next Mammo: Today   Pap:   Lab Results   Component Value Date    PAP NIL, NEG-HPV 2018    PAP NIL, NEG-HPV 2017    PAP NIL 2016   Colonoscopy:  12/17/10, Result: Normal, Next Colonoscopy: 10 years.  Dexa:  13  Spine 2.1, Hip 1.8  Health maintenance updated:  Discuss Lipids and Shingles vaccine    HISTORY:  OB History    Para Term  AB Living   2 2 2 0 0 2   SAB TAB Ectopic Multiple Live Births   0 0 0 0 2      # Outcome Date GA Lbr Brando/2nd Weight Sex Delivery Anes PTL Lv   2 Term         IRINA   1 Term         IRINA       Patient Active Problem List   Diagnosis     Hypothyroidism     Screening for cervical cancer     Past Surgical History:   Procedure Laterality Date     C LAPAROSCOPIC SUPRACERVICAL HYSTERECTOMY (SUBTOTAL HYSTERECTOMY), WITH OR  age 32    subtotal hysterectomy     HC THYROID LOBECTOMY,UNILAT  age 37      Social History     Tobacco Use     Smoking status: Never Smoker     Smokeless tobacco: Never Used   Substance Use Topics     Alcohol use: Yes     Alcohol/week: 10.0 standard drinks     Comment:  "wine      Problem (# of Occurrences) Relation (Name,Age of Onset)    Cardiovascular (1) Mother: CHF    Diabetes (1) Father: type 2    Hypertension (1) Mother    Neurologic Disorder (1) Father: Parkinson            Current Outpatient Medications   Medication Sig     Ascorbic Acid (VITAMIN C PO)      Cyanocobalamin (VITAMIN B-12 PO)      escitalopram (LEXAPRO) 10 MG tablet      levothyroxine (SYNTHROID/LEVOTHROID) 125 MCG tablet      LORazepam (ATIVAN) 0.5 MG tablet      Multiple Vitamins-Minerals (MULTIVITAMIN PO)      No current facility-administered medications for this visit.      Allergies   Allergen Reactions     Sulfa Drugs        Past medical, surgical, social and family histories were reviewed and updated in EPIC.    ROS:   12 point review of systems negative other than symptoms noted below or in the HPI.  No urinary frequency or dysuria, bladder or kidney problems    EXAM:  /74   Ht 1.753 m (5' 9\")   Wt 114.3 kg (252 lb)   BMI 37.21 kg/m     BMI: Body mass index is 37.21 kg/m .    PHYSICAL EXAM:  Constitutional:   Appearance: Well nourished, well developed, alert, in no acute distress  Neck:  Lymph Nodes:  No lymphadenopathy present    Thyroid:  Gland size normal, nontender, no nodules or masses present  on palpation  Chest:  Respiratory Effort:  Breathing unlabored  Cardiovascular:    Heart: Auscultation:  Regular rate, normal rhythm, no murmurs present  Breasts: Inspection of Breasts:  No lymphadenopathy present., Palpation of Breasts and Axillae:  No masses present on palpation, no breast tenderness., Axillary Lymph Nodes:  No lymphadenopathy present. and No nodularity, asymmetry or nipple discharge bilaterally.  Gastrointestinal:   Abdominal Examination:  Abdomen nontender to palpation, tone normal without rigidity or guarding, no masses present, umbilicus without lesions   Liver and Spleen:  No hepatomegaly present, liver nontender to palpation    Hernias:  No hernias present  Lymphatic: Lymph " Nodes:  No other lymphadenopathy present  Skin:  General Inspection:  No rashes present, no lesions present, no areas of  discoloration  Neurologic:    Mental Status:  Oriented X3.  Normal strength and tone, sensory exam                grossly normal, mentation intact and speech normal.    Psychiatric:   Mentation appears normal and affect normal/bright.         Pelvic Exam:  External Genitalia:     Normal appearance for age, no discharge present, no tenderness present, no inflammatory lesions present, color normal  Vagina:     Normal vaginal vault without central or paravaginal defects, no discharge present, no inflammatory lesions present, no masses present  Bladder:     Nontender to palpation  Urethra:   Urethral Body:  Urethra palpation normal, urethra structural support normal   Urethral Meatus:  No erythema or lesions present  Cervix:     Appearance healthy, no lesions present, nontender to palpation, no bleeding present  Uterus:     Surgically absent  Adnexa:     No adnexal tenderness present, no adnexal masses present  Perineum:     Perineum within normal limits, no evidence of trauma, no rashes or skin lesions present  Anus:     Anus within normal limits, no hemorrhoids present  Inguinal Lymph Nodes:     No lymphadenopathy present  Pubic Hair:     Normal pubic hair distribution for age  Genitalia and Groin:     No rashes present, no lesions present, no areas of discoloration, no masses present      COUNSELING:   Reviewed preventive health counseling, as reflected in patient instructions       Regular exercise       Healthy diet/nutrition       Osteoporosis Prevention/Bone Health       Colon cancer screening       (Tori)menopause management    BMI: Body mass index is 37.21 kg/m .  Weight management plan: Discussed healthy diet and exercise guidelines    ASSESSMENT:  54 year old female with satisfactory annual exam.    ICD-10-CM    1. Encounter for gynecological examination without abnormal finding Z01.419  Pap imaged thin layer screen with HPV - recommended age 30 - 65     HPV High Risk Types DNA Cervical   2. Encounter for lipid screening for cardiovascular disease Z13.220 Lipid panel reflex to direct LDL Fasting    Z13.6    3. Screening for metabolic disorder Z13.228 Comprehensive metabolic panel   4. Special screening for osteoporosis Z13.820 DX Hip/Pelvis/Spine       PLAN:  Normal Gyn exam.  Will notify patient with lab results when available.  Return prn or 1 year for annual.    AKTY Jarrett CNP

## 2020-01-20 NOTE — LETTER
January 24, 2020    Mariann Sullivan     [facility] [address]    Dear ,  This letter is regarding your recent Pap smear (cervical cancer screening) and Human Papillomavirus (HPV) test.  We are happy to inform you that your Pap smear result is normal. Cervical cancer is closely linked with certain types of HPV. Your results showed no evidence of high-risk HPV.  We recommend you have your next PAP smear and HPV test in 5 years.  You will still need to return to the clinic every year for an annual exam and other preventive tests.  If you have additional questions regarding this result, please call our registered nurse, Liza at [phone].  Sincerely,    Krissy Garcia, APRN CNP/rlm

## 2020-01-21 ASSESSMENT — ANXIETY QUESTIONNAIRES: GAD7 TOTAL SCORE: 3

## 2020-01-23 LAB
COPATH REPORT: NORMAL
PAP: NORMAL

## 2020-01-27 DIAGNOSIS — Z13.220 ENCOUNTER FOR LIPID SCREENING FOR CARDIOVASCULAR DISEASE: ICD-10-CM

## 2020-01-27 DIAGNOSIS — Z13.228 SCREENING FOR METABOLIC DISORDER: ICD-10-CM

## 2020-01-27 DIAGNOSIS — Z13.6 ENCOUNTER FOR LIPID SCREENING FOR CARDIOVASCULAR DISEASE: ICD-10-CM

## 2020-01-27 LAB
ALBUMIN SERPL-MCNC: 3.7 G/DL (ref 3.4–5)
ALP SERPL-CCNC: 88 U/L (ref 40–150)
ALT SERPL W P-5'-P-CCNC: 32 U/L (ref 0–50)
ANION GAP SERPL CALCULATED.3IONS-SCNC: 5 MMOL/L (ref 3–14)
AST SERPL W P-5'-P-CCNC: 20 U/L (ref 0–45)
BILIRUB SERPL-MCNC: 1.1 MG/DL (ref 0.2–1.3)
BUN SERPL-MCNC: 10 MG/DL (ref 7–30)
CALCIUM SERPL-MCNC: 9 MG/DL (ref 8.5–10.1)
CHLORIDE SERPL-SCNC: 105 MMOL/L (ref 94–109)
CHOLEST SERPL-MCNC: 172 MG/DL
CO2 SERPL-SCNC: 29 MMOL/L (ref 20–32)
CREAT SERPL-MCNC: 0.88 MG/DL (ref 0.52–1.04)
GFR SERPL CREATININE-BSD FRML MDRD: 74 ML/MIN/{1.73_M2}
GLUCOSE SERPL-MCNC: 95 MG/DL (ref 70–99)
HDLC SERPL-MCNC: 58 MG/DL
LDLC SERPL CALC-MCNC: 94 MG/DL
NONHDLC SERPL-MCNC: 114 MG/DL
POTASSIUM SERPL-SCNC: 4.4 MMOL/L (ref 3.4–5.3)
PROT SERPL-MCNC: 7.9 G/DL (ref 6.8–8.8)
SODIUM SERPL-SCNC: 139 MMOL/L (ref 133–144)
TRIGL SERPL-MCNC: 101 MG/DL

## 2020-01-27 PROCEDURE — 36415 COLL VENOUS BLD VENIPUNCTURE: CPT | Performed by: NURSE PRACTITIONER

## 2020-01-27 PROCEDURE — 80061 LIPID PANEL: CPT | Performed by: NURSE PRACTITIONER

## 2020-01-27 PROCEDURE — 80053 COMPREHEN METABOLIC PANEL: CPT | Performed by: NURSE PRACTITIONER

## 2020-01-27 NOTE — RESULT ENCOUNTER NOTE
Vera      Your results are normal.  If further questions please give me a call.    Shahnaz Garcia RNC

## 2020-03-15 ENCOUNTER — HEALTH MAINTENANCE LETTER (OUTPATIENT)
Age: 55
End: 2020-03-15

## 2020-04-09 ENCOUNTER — HOSPITAL ENCOUNTER (EMERGENCY)
Facility: CLINIC | Age: 55
Discharge: HOME | End: 2020-04-09
Attending: EMERGENCY MEDICINE | Admitting: EMERGENCY MEDICINE
Payer: OTHER GOVERNMENT

## 2020-04-09 ENCOUNTER — VIRTUAL VISIT (OUTPATIENT)
Dept: FAMILY MEDICINE | Facility: OTHER | Age: 55
End: 2020-04-09

## 2020-04-09 ENCOUNTER — APPOINTMENT (OUTPATIENT)
Dept: GENERAL RADIOLOGY | Facility: CLINIC | Age: 55
End: 2020-04-09
Attending: EMERGENCY MEDICINE
Payer: OTHER GOVERNMENT

## 2020-04-09 VITALS
OXYGEN SATURATION: 98 % | HEART RATE: 72 BPM | RESPIRATION RATE: 20 BRPM | SYSTOLIC BLOOD PRESSURE: 120 MMHG | DIASTOLIC BLOOD PRESSURE: 85 MMHG | TEMPERATURE: 98.2 F

## 2020-04-09 DIAGNOSIS — R05.9 COUGH: ICD-10-CM

## 2020-04-09 DIAGNOSIS — Z20.822 SUSPECTED COVID-19 VIRUS INFECTION: ICD-10-CM

## 2020-04-09 LAB
ANION GAP SERPL CALCULATED.3IONS-SCNC: 5 MMOL/L (ref 3–14)
BASOPHILS # BLD AUTO: 0.1 10E9/L (ref 0–0.2)
BASOPHILS NFR BLD AUTO: 0.7 %
BUN SERPL-MCNC: 8 MG/DL (ref 7–30)
CALCIUM SERPL-MCNC: 8.6 MG/DL (ref 8.5–10.1)
CHLORIDE SERPL-SCNC: 108 MMOL/L (ref 94–109)
CO2 SERPL-SCNC: 27 MMOL/L (ref 20–32)
CREAT SERPL-MCNC: 0.87 MG/DL (ref 0.52–1.04)
DEPRECATED S PYO AG THROAT QL EIA: NEGATIVE
DIFFERENTIAL METHOD BLD: NORMAL
EOSINOPHIL # BLD AUTO: 0.2 10E9/L (ref 0–0.7)
EOSINOPHIL NFR BLD AUTO: 2.1 %
ERYTHROCYTE [DISTWIDTH] IN BLOOD BY AUTOMATED COUNT: 14.6 % (ref 10–15)
GFR SERPL CREATININE-BSD FRML MDRD: 74 ML/MIN/{1.73_M2}
GLUCOSE SERPL-MCNC: 99 MG/DL (ref 70–99)
HCT VFR BLD AUTO: 45.2 % (ref 35–47)
HGB BLD-MCNC: 14.3 G/DL (ref 11.7–15.7)
IMM GRANULOCYTES # BLD: 0 10E9/L (ref 0–0.4)
IMM GRANULOCYTES NFR BLD: 0.3 %
LYMPHOCYTES # BLD AUTO: 1.5 10E9/L (ref 0.8–5.3)
LYMPHOCYTES NFR BLD AUTO: 20.6 %
MCH RBC QN AUTO: 28.5 PG (ref 26.5–33)
MCHC RBC AUTO-ENTMCNC: 31.6 G/DL (ref 31.5–36.5)
MCV RBC AUTO: 90 FL (ref 78–100)
MONOCYTES # BLD AUTO: 0.3 10E9/L (ref 0–1.3)
MONOCYTES NFR BLD AUTO: 4.8 %
NEUTROPHILS # BLD AUTO: 5.1 10E9/L (ref 1.6–8.3)
NEUTROPHILS NFR BLD AUTO: 71.5 %
NRBC # BLD AUTO: 0 10*3/UL
NRBC BLD AUTO-RTO: 0 /100
PLATELET # BLD AUTO: 262 10E9/L (ref 150–450)
POTASSIUM SERPL-SCNC: 3.7 MMOL/L (ref 3.4–5.3)
RBC # BLD AUTO: 5.02 10E12/L (ref 3.8–5.2)
SODIUM SERPL-SCNC: 140 MMOL/L (ref 133–144)
SPECIMEN SOURCE: NORMAL
SPECIMEN SOURCE: NORMAL
STREP GROUP A PCR: NOT DETECTED
TROPONIN I SERPL-MCNC: <0.015 UG/L (ref 0–0.04)
WBC # BLD AUTO: 7.1 10E9/L (ref 4–11)

## 2020-04-09 PROCEDURE — 85025 COMPLETE CBC W/AUTO DIFF WBC: CPT | Performed by: EMERGENCY MEDICINE

## 2020-04-09 PROCEDURE — 99285 EMERGENCY DEPT VISIT HI MDM: CPT | Mod: 25

## 2020-04-09 PROCEDURE — 87651 STREP A DNA AMP PROBE: CPT | Performed by: EMERGENCY MEDICINE

## 2020-04-09 PROCEDURE — 84484 ASSAY OF TROPONIN QUANT: CPT | Performed by: EMERGENCY MEDICINE

## 2020-04-09 PROCEDURE — 80048 BASIC METABOLIC PNL TOTAL CA: CPT | Performed by: EMERGENCY MEDICINE

## 2020-04-09 PROCEDURE — 71045 X-RAY EXAM CHEST 1 VIEW: CPT

## 2020-04-09 PROCEDURE — 40001204 ZZHCL STATISTIC STREP A RAPID: Performed by: EMERGENCY MEDICINE

## 2020-04-09 PROCEDURE — 93005 ELECTROCARDIOGRAM TRACING: CPT

## 2020-04-09 RX ORDER — BENZONATATE 100 MG/1
100 CAPSULE ORAL 2 TIMES DAILY PRN
Qty: 12 CAPSULE | Refills: 0 | Status: SHIPPED | OUTPATIENT
Start: 2020-04-09 | End: 2023-10-20

## 2020-04-09 ASSESSMENT — ENCOUNTER SYMPTOMS
SORE THROAT: 1
CHILLS: 1
SHORTNESS OF BREATH: 1
COUGH: 1
CHEST TIGHTNESS: 1

## 2020-04-09 NOTE — PROGRESS NOTES
"Date: 2020 15:06:39  Clinician: Nathalie Guzman  Clinician NPI: [id number]  Patient: Mariann Sullivan  Patient : 1965  Patient Address: [address] [place] [address]  Patient Phone: [phone]  Visit Protocol: URI  Patient Summary:  Mariann is a 55 year old ( : 1965 ) female who initiated a Visit for COVID-19 (Coronavirus) evaluation and screening. When asked the question \"Please sign me up to receive news, health information and promotions. \", Mariann responded \"No\".    Mariann states her symptoms started gradually 7-9 days ago. After her symptoms started, they improved and then got worse again.   Her symptoms consist of facial pain or pressure, myalgia, a sore throat, a cough, nasal congestion, malaise, enlarged lymph nodes, and chills.   Symptom details     Nasal secretions: The color of her mucus is clear.    Cough: Mariann coughs a few times an hour and her cough is more bothersome at night. Phlegm does not come into her throat when she coughs. She does not believe her cough is caused by post-nasal drip.     Sore throat: Mariann reports having moderate throat pain (4-6 on a 10 point pain scale), does not have exudate on her tonsils, and can swallow liquids. The lymph nodes in her neck are enlarged. A rash has not appeared on the skin since the sore throat started.     Facial pain or pressure: The facial pain or pressure feels worse when bending over or leaning forward.      Mariann denies having rhinitis, ear pain, diarrhea, vomiting, nausea, teeth pain, headache, fever, and wheezing. She also denies taking antibiotic medication for the symptoms, having recent facial or sinus surgery in the past 60 days, and having a sinus infection within the past year.   Precipitating events  Within the past week, Mariann has not been exposed to someone with strep throat. She has not recently been exposed to someone with influenza. Mariann has not been in close contact with any high risk individuals.   " Pertinent COVID-19 (Coronavirus) information  Mariann has not traveled internationally or to the areas where COVID-19 (Coronavirus) is widespread, including cruise ship travel in the last 14 days before the start of her symptoms.   Mariann has not had a close contact with a laboratory-confirmed COVID-19 patient within 14 days of symptom onset. She also has not had a close contact with a suspected COVID-19 patient within 14 days of symptom onset.   Mariann does not work or volunteer as healthcare worker or a  and does not work or volunteer in a healthcare facility. She does not live with a healthcare worker.   Triage Point(s) temporarily suspended for COVID-19 (Coronavirus) screening  Mariann reported the following symptoms which were previously protocol referral points. These protocol referral points have temporarily been removed for purposes of COVID-19 (Coronavirus) screening.   Difficulty breathing even when resting and can only speak in phrase(s)   Pertinent medical history  Mariann does not get yeast infections when she takes antibiotics.   Mariann does not need a return to work/school note.   Weight: 240 lbs   Mariann does not smoke or use smokeless tobacco.   Additional information as reported by the patient (free text): My symptoms became worse on Sunday. I have a dry cough and chills as well. I started to experience tightness in my chest yesterday and continue to do so with some shortness of breath. I also can can hear some girgling type noises in my chest as well.   Weight: 240 lbs    MEDICATIONS: lorazepam oral, escitalopram oxalate oral, levothyroxine oral, ALLERGIES: NKDA  Clinician Response:  Dear Mariann,    Based on the information you have provided, further evaluation in urgent care is indicated. You may be seen in one of our designated urgent care sites that is prepared to see patients who have symptoms that could indicate Coronavirus (Covid-19).   For your information: At this time we will NOT perform  coronavirus testing in urgent care sites. This test is currently being reserved for patients who are being admitted to the hospital.  [facility] Locations: [place], [place], [place], [place], [place], [place], [place], [place]), [place] and [place]  Please call bryce ([phone]) to schedule an urgent care appointment at one of our urgent care or walk in care sites. It is essential that you tell them when you call that you were referred to be scheduled for in-person urgent [facility] appointment [facility].      [facility]: If you usually get care or are located in the [facility] area, you can be seen as a walk in without an appointment at the [facility]. This is open from 8AM-8PM on weekdays and 10am-8pm on weekends. The phone number to this clinic is [phone].     PLEASE BRING DOCUMENTATION FROM THIS COMPLETED [facility] VISIT TO YOUR URGENT CARE VISIT.     For more information about COVID19 and options for caring for yourself at home, please visit the CDC website at https://www.cdc.gov/coronavirus/2019-ncov/about/steps-when-sick.html  For more options for care at [facility], please visit our website at [email or web address]      Diagnosis: Cough  Diagnosis ICD: R05  Additional Clinician Notes: I would like you to be seen for a face-to-face visit as your symptoms have been worsening and you now notice fluid in the lungs. This may indicate a possible pneumonia. During your visit you should expect the provider to listen to your lungs, check your oxygen levels, and possibly obtain an x-ray to help rule out possible bacterial pneumonia.

## 2020-04-09 NOTE — ED AVS SNAPSHOT
[facility] Emergency Department  [address]  [facility] [address]  Phone:  [phone]  Fax:  [phone]                                    Mariann Sullivan   MRN: [id number]    Department:  [facility] Emergency Department   Date of Visit:  4/9/2020           After Visit Summary Signature Page    I have received my discharge instructions, and my questions have been answered. I have discussed any challenges I see with this plan with the nurse or doctor.    ..........................................................................................................................................  Patient/Patient Representative Signature      ..........................................................................................................................................  Patient Representative Print Name and Relationship to Patient    ..................................................               ................................................  Date                                   Time    ..........................................................................................................................................  Reviewed by Signature/Title    ...................................................              ..............................................  Date                                               Time          22EPIC Rev 08/18

## 2020-04-09 NOTE — DISCHARGE INSTRUCTIONS
Discharge Instructions  COVID-19    Your Provider has determined that you should practice self-isolation and self-monitoring in order to protect yourself and your community from COVID-19, which is the disease caused by a new coronavirus. The virus spreads from person to person primarily by droplets when an infected person coughs or sneezes and the droplet either lands on another person or that other person touches a surface with the droplet on it. Diagnosis of COVID-19 can be made with a test but many times the test is unavailable or not necessary. There is no specific treatment or medicine for the disease.    Symptoms of COVID-19  Many people have no symptoms or mild symptoms.  Symptoms may usually appear 4 to 5 days (up to 14 days) after contact with another ill person. Some people will get severe symptoms and pneumonia. Usual symptoms are:     Fever    Cough   Trouble breathing   Less common symptoms are: Headache, body aches, sore    throat, sneezing, diarrhea.    How to Care for Yourself    Stay home.  Most people will recover from illness with mild symptoms.  Isolation by staying home is the best method to prevent the spread of the illness. Do not go to work or school. Have a friend or relative do your shopping. Do not use public transportation (bus, train) or ridesharing (Lyft, Uber).    How long should I stay home?  If you have symptoms of a respiratory disease (fever, cough), you should stay home for at least 7 days, and for 3 days with no fever and improvement of respiratory symptoms--whichever is longer. (Your fever should be gone for 3 days without using fever-reducing medicine.)    For example, if you have a fever and coughing for 4 days, you need to stay home 3 more days with no fever for a total of 7 days. Or, if you have a fever and coughing for 5 days, you need to stay home 3 more days with no fever for a total of 8 days.    Separate yourself from other people in your home.?As much as possible, you  should stay in one room and away from other people in your home. Also, use a separate bathroom, if possible. Avoid handling pets or other animals while sick.     Wear a facemask if you need to be around other people and cover your mouth and nose with a tissue when you cough or sneeze.     Avoid sharing personal household items. You should not share dishes, drinking glasses, forks/knives/spoons, towels, or bedding with other people in your home. After using these items, they should be washed with soap and water. Clean parts of your home that are touched often (doorknobs, faucets, countertops, etc.) daily.     Wash your hands often with soap and water for at least 20 seconds or use an alcohol-based hand  containing at least 60% alcohol.     Avoid touching your face.    Treat your symptoms: Take Acetaminophen (Tylenol) to treat body aches and fever as needed for comfort. Ibuprofen (Advil or Motrin) can be used as well if you still have symptoms after taking Tylenol.  Drink fluids. Rest.    Watch for worsening symptoms, shortness of breath, or difficulty   breathing.    Return to the Emergency Department if:    If you are developing worsening breathing, shortness of breath, or feel worse you should seek medical attention.  If you are uncertain, contact your health care provider/clinic. If you need emergency medical attention, call 911 and tell them you have been ill.

## 2020-04-09 NOTE — ED PROVIDER NOTES
History     Chief Complaint:  Cough      HPI   Mariann Sullivan is a 55-year-old female who presents to the emergency department for evaluation of a cough.  Patient underwent a virtual visit earlier today, where she was recommended to present to the ER for evaluation.  Patient reports for the past 7 to 9 days, she has been dealing with a cough, facial pain, pressure, body aches, sore throat, and chills.  She has not been in contact with any individuals known to be diagnosed with COVID-19, nor has she traveled outside of the state, country, or on cruise ships.  He does not work as a healthcare provider or volunteer any healthcare facility.  Symptoms worsened on Sunday, and she has now experienced tightness in her chest beginning yesterday, as well as shortness of breath.  She did record a fever of 101 last week, though none since.  Has tried Mucinex at home.  No other concerns are voiced at this time.      Allergies:  Sulfa Drugs     Medications:    Levothyroxine   Lexapro  Lorazepam    Past Medical History:    Anxiety  Depressive disorder  Fatigue   Thyroid disease  Concussion    Past Surgical History:    Hysterectomy  Thyroidectomy    Family History:    DM, father  Neurologic disorder, father  HTN, mother  CHF, mother  Cancer, mother  Osteoporosis, mother    Social History:  Smoking status: Never smoker  Alcohol use: Yes  Drug use: No  PCP: Physician No Ref-Primary  Marital Status:  Single [1]    Review of Systems   Constitutional: Positive for chills.        Body aches   HENT: Positive for sore throat.         Facial pain   Respiratory: Positive for cough, chest tightness and shortness of breath.    All other systems reviewed and are negative.      Physical Exam     Patient Vitals for the past 24 hrs:   BP Temp Temp src Pulse Heart Rate Resp SpO2   04/09/20 1915 120/85 -- -- 72 71 -- 98 %   04/09/20 1900 (!) 158/100 -- -- 66 66 -- 99 %   04/09/20 1830 (!) 135/99 -- -- 70 69 -- 100 %   04/09/20 1730 (!) 143/93 --  -- 72 70 -- 98 %   04/09/20 1648 (!) 150/95 98.2  F (36.8  C) Oral -- 76 20 95 %       Physical Exam   General:              Well-nourished              Speaking in full sentences  Eyes:              Conjunctiva without injection or scleral icterus  ENT:              Moist mucous membranes              Nares patent              Pinnae normal  Neck:              Full ROM              No stiffness appreciated  Resp:              Lungs CTAB              No crackles, wheezing or audible rubs              Good air movement  CV:                    Normal rate, regular rhythm              S1 and S2 present              No murmur, gallop or rub  GI:              BS present              Abdomen soft without distention              Non-tender to light and deep palpation              No guarding or rebound tenderness  Skin:              Warm, dry, well perfused              No rashes or open wounds on exposed skin  MSK:              Moves all extremities              No focal deformities or swelling  Neuro:              Alert              Answers questions appropriately              Moves all extremities equally              Gait stable  Psych:              Normal affect, normal mood      Emergency Department Course     ECG:  ECG taken at 17:13, ECG read at 17:22  Normal sinus rhythm  Normal ECG  Rate 77 bpm. NE interval 184 ms. QRS duration 88 ms. QT/QTc 400/452 ms. P-R-T axes 40 27.    Imaging:  Radiology findings were communicated with the patient who voiced understanding of the findings.    Chest  XR PORT:  Negative chest.     Reading per radiology    Laboratory:  Laboratory findings were communicated with the patient who voiced understanding of the findings.      CBC: WBC 7.1, HGB 14.3,   BMP: o/w WNL (Creatinine: 0.87)  Troponin (Collected 1748): <0.015   Rapid Strep Test: negative   Strep Culture: Pending     Procedures    Emergency Department Course:   Nursing notes and vitals reviewed.    1634 I performed an  exam of the patient as documented above.     1702 IV was inserted and blood was drawn for laboratory testing, results above.     1715 EKG was performed, see results above.     1743 The patient had a PORT Chest XR while in the emergency department, results above.      1845 Patient updated on results of studies.    1845 I personally reviewed the results with the patient and answered all related questions prior to discharge.    Impression & Plan      Medical Decision Making:  Mariann Sullivan is a 55 year old female who presents to the emergency department today for evaluation of a cough.  History is obtained from the patient as well as per chart review.  Differential diagnosis includes URI, bronchitis, viral syndrome, pneumonia, PE, ACS, among others.  Based on the above history, examination, and evaluation, patient symptoms certainly are suspicious for viral syndrome, including COVID-19, given that we are seeing evidence of community spread.  Patient is currently without evidence of increased work of breathing, hypoxia, tachypnea, and do feel she does not require hospitalization at this time.  Additionally, she is not a healthcare worker, nor is she institutionalized, or dialysis dependent, placing her at increased risk.  For that reason, in accordance with guidelines put forth by the [facility], no indication for testing at this time.  Chest x-ray negative for pneumonia or infiltrate.  With regards to patient's chest tightness, I feel this is most consistent with underlying URI.  EKG demonstrates sinus rhythm without findings of acute ischemia.  Her troponin returned undetectable.  I considered pulmonary embolism though clinically feel this to be unlikely.  Again, she has clear infectious symptoms including body aches, fever, cough, sore throat, and chills.  She is without tachycardia, nor hypoxia.  She has no exam findings consistent with DVT.  With reasonable clinical certainty I feel further evaluation for PE can be  deferred safely.  Rapid strep test returned negative.  No evidence of deep space neck infection.  Results and clinical impression were discussed with the patient.  I have stressed the importance of self quarantine at home isolation.  We discussed recommendations for forth by MD in terms of timing of isolation.  I recommended fluids, rest, Tylenol as needed, and Tessalon for cough suppression.  Follow-up with PCP in 2 days if symptoms worsen.  Return to ER with shortness of breath, cough, persistent fevers, generalized weakness, or any other concerns.  Patient felt comfortable with this plan of care.  All of her questions were answered prior to discharge.      Covid-19  aMriann Sullivan was evaluated during a global COVID-19 pandemic, which necessitated consideration that the patient might be at risk for infection with the SARS-CoV-2 virus that causes COVID-19.   Applicable protocols for evaluation were followed during the patient's care.     Diagnosis:    ICD-10-CM    1. Cough  R05    2. Suspected Covid-19 Virus Infection  R68.89        Disposition:   The patient is discharged to home.     Scribe Disclosure:  I, Abigail Parker, am serving as a scribe at 1634 on 4/9/2020 to document services personally performed by Christiano Arshad MD based on my observations and the provider's statements to me.  [facility] EMERGENCY DEPARTMENT       Christiano Arshad MD  04/09/20 6298

## 2020-04-09 NOTE — ED TRIAGE NOTES
Patient presents with c/o cough x4 days. Started having SOB and midsternal CP yesterday. Occasional nausea and diarrhea. Reports fevers earlier in the week. ABC's intact.

## 2020-04-10 LAB — INTERPRETATION ECG - MUSE: NORMAL

## 2021-01-15 ENCOUNTER — HEALTH MAINTENANCE LETTER (OUTPATIENT)
Age: 56
End: 2021-01-15

## 2021-03-14 ENCOUNTER — HEALTH MAINTENANCE LETTER (OUTPATIENT)
Age: 56
End: 2021-03-14

## 2021-03-19 NOTE — PROGRESS NOTES
Mariann is a 56 year old  female who presents for annual exam.     Besides routine health maintenance,  she would like to discuss thyroid and weight gain.    HPI:  Here for annual exam.  She had covid in 2020.  Also lost job so has not worked for past year.  Has gained some weight. Wondering about thyroid medication was increased last year.  Although patient is not exercising as much, diet okay.  Will check TSH today.  She has had a LASH 20 some years ago per patient, no HRT.    The patient's PCP is  Physician No Ref-Primary.        GYNECOLOGIC HISTORY:    No LMP recorded. Patient has had a hysterectomy.    Her current contraception method is: hysterectomy.  She  reports that she has never smoked. She has never used smokeless tobacco.    Patient is not sexually active.  STD testing offered?  Declined  Last PHQ-9 score on record =   PHQ-9 SCORE 3/22/2021   PHQ-9 Total Score 3     Last GAD7 score on record =   GHANSHYAM-7 SCORE 2020   Total Score 3     Alcohol Score = 0    HEALTH MAINTENANCE:  Cholesterol:   Recent Labs   Lab Test 20  0804 14   CHOL 172 149   HDL 58 49   LDL 94 85   TRIG 101 73       Last Mammo: One year ago, Result: Normal, Next Mammo: Today   Pap:   Lab Results   Component Value Date    PAP NIL 2020    PAP NIL 2018    PAP NIL 2017      Colonoscopy:  2010, Result: diverticulosis, Next Colonoscopy: was due 2020.    Dexa:  2020    Health maintenance updated:  no    HISTORY:  OB History    Para Term  AB Living   2 2 2 0 0 2   SAB TAB Ectopic Multiple Live Births   0 0 0 0 2      # Outcome Date GA Lbr Brando/2nd Weight Sex Delivery Anes PTL Lv   2 Term         IRINA   1 Term         IRINA       Patient Active Problem List   Diagnosis     Hypothyroidism     Screening for cervical cancer     Past Surgical History:   Procedure Laterality Date     C LAPAROSCOPIC SUPRACERVICAL HYSTERECTOMY (SUBTOTAL HYSTERECTOMY), WITH OR  age 32    subtotal  "hysterectomy     HC THYROID LOBECTOMY,UNILAT  age 37      Social History     Tobacco Use     Smoking status: Never Smoker     Smokeless tobacco: Never Used   Substance Use Topics     Alcohol use: Yes     Alcohol/week: 10.0 standard drinks     Comment: wine      Problem (# of Occurrences) Relation (Name,Age of Onset)    Cardiovascular (1) Mother: CHF    Diabetes (1) Father: type 2    Hypertension (1) Mother    Neurologic Disorder (1) Father: Parkinson            Current Outpatient Medications   Medication Sig     Cholecalciferol (VITAMIN D-3) 25 MCG (1000 UT) CAPS      Cyanocobalamin (VITAMIN B-12 PO)      escitalopram (LEXAPRO) 10 MG tablet      levothyroxine (SYNTHROID/LEVOTHROID) 137 MCG tablet Take 137 mcg by mouth daily     LORazepam (ATIVAN) 0.5 MG tablet      Multiple Vitamins-Minerals (MULTIVITAMIN PO)      benzonatate (TESSALON) 100 MG capsule Take 1 capsule (100 mg) by mouth 2 times daily as needed for cough (Patient not taking: Reported on 3/22/2021)     No current facility-administered medications for this visit.      Allergies   Allergen Reactions     Sulfa Drugs        Past medical, surgical, social and family histories were reviewed and updated in EPIC.    ROS:   12 point review of systems negative other than symptoms noted below or in the HPI.  No urinary frequency or dysuria, bladder or kidney problems    EXAM:  /62   Ht 1.753 m (5' 9\")   Wt 122.9 kg (271 lb)   Breastfeeding No   BMI 40.02 kg/m     BMI: Body mass index is 40.02 kg/m .    PHYSICAL EXAM:  Constitutional:   Appearance: Well nourished, well developed, alert, in no acute distress  Neck:  Lymph Nodes:  No lymphadenopathy present    Thyroid:  Gland size normal, nontender, no nodules or masses present  on palpation  Chest:  Respiratory Effort:  Breathing unlabored  Cardiovascular:    Heart: Auscultation:  Regular rate, normal rhythm, no murmurs present  Breasts: Inspection of Breasts:  No lymphadenopathy present., Palpation of " Breasts and Axillae:  No masses present on palpation, no breast tenderness., Axillary Lymph Nodes:  No lymphadenopathy present. and No nodularity, asymmetry or nipple discharge bilaterally.  Gastrointestinal:   Abdominal Examination:  Abdomen nontender to palpation, tone normal without rigidity or guarding, no masses present, umbilicus without lesions   Liver and Spleen:  No hepatomegaly present, liver nontender to palpation    Hernias:  No hernias present  Lymphatic: Lymph Nodes:  No other lymphadenopathy present  Skin:  General Inspection:  No rashes present, no lesions present, no areas of  discoloration  Neurologic:    Mental Status:  Oriented X3.  Normal strength and tone, sensory exam                grossly normal, mentation intact and speech normal.    Psychiatric:   Mentation appears normal and affect normal/bright.         Pelvic Exam:  External Genitalia:     Normal appearance for age, no discharge present, no tenderness present, no inflammatory lesions present, color normal  Vagina:     Normal vaginal vault without central or paravaginal defects, no discharge present, no inflammatory lesions present, no masses present  Bladder:     Nontender to palpation  Urethra:   Urethral Body:  Urethra palpation normal, urethra structural support normal   Urethral Meatus:  No erythema or lesions present  Cervix:     Appearance healthy, no lesions present, nontender to palpation, no bleeding present  Uterus:     Surgically absent  Adnexa:     No adnexal tenderness present, no adnexal masses present  Perineum:     Perineum within normal limits, no evidence of trauma, no rashes or skin lesions present  Anus:     Anus within normal limits, no hemorrhoids present  Inguinal Lymph Nodes:     No lymphadenopathy present  Pubic Hair:     Normal pubic hair distribution for age  Genitalia and Groin:     No rashes present, no lesions present, no areas of discoloration, no masses present      COUNSELING:   Reviewed preventive  health counseling, as reflected in patient instructions       Regular exercise       Healthy diet/nutrition       Osteoporosis prevention/bone health       Colon cancer screening       (Tori)menopause management    BMI: Body mass index is 40.02 kg/m .  Weight management plan: Discussed healthy diet and exercise guidelines    ASSESSMENT:  56 year old female with satisfactory annual exam.    ICD-10-CM    1. Encounter for gynecological examination without abnormal finding  Z01.419    2. Acquired hypothyroidism  E03.9 TSH       PLAN:  Normal Gyn exam.  Will notify patient with lab results when available.  Return prn or 1 year for annual.    KATY Jarrett CNP

## 2021-03-22 ENCOUNTER — ANCILLARY PROCEDURE (OUTPATIENT)
Dept: MAMMOGRAPHY | Facility: CLINIC | Age: 56
End: 2021-03-22
Payer: COMMERCIAL

## 2021-03-22 ENCOUNTER — OFFICE VISIT (OUTPATIENT)
Dept: OBGYN | Facility: CLINIC | Age: 56
End: 2021-03-22
Payer: COMMERCIAL

## 2021-03-22 VITALS
HEIGHT: 69 IN | BODY MASS INDEX: 40.14 KG/M2 | SYSTOLIC BLOOD PRESSURE: 104 MMHG | DIASTOLIC BLOOD PRESSURE: 62 MMHG | WEIGHT: 271 LBS

## 2021-03-22 DIAGNOSIS — Z01.419 ENCOUNTER FOR GYNECOLOGICAL EXAMINATION WITHOUT ABNORMAL FINDING: Primary | ICD-10-CM

## 2021-03-22 DIAGNOSIS — E03.9 ACQUIRED HYPOTHYROIDISM: ICD-10-CM

## 2021-03-22 DIAGNOSIS — Z12.31 VISIT FOR SCREENING MAMMOGRAM: ICD-10-CM

## 2021-03-22 LAB — TSH SERPL DL<=0.005 MIU/L-ACNC: 0.46 MU/L (ref 0.4–4)

## 2021-03-22 PROCEDURE — 99396 PREV VISIT EST AGE 40-64: CPT | Performed by: NURSE PRACTITIONER

## 2021-03-22 PROCEDURE — 84443 ASSAY THYROID STIM HORMONE: CPT | Performed by: NURSE PRACTITIONER

## 2021-03-22 PROCEDURE — 36415 COLL VENOUS BLD VENIPUNCTURE: CPT | Performed by: NURSE PRACTITIONER

## 2021-03-22 PROCEDURE — 77067 SCR MAMMO BI INCL CAD: CPT | Mod: TC | Performed by: RADIOLOGY

## 2021-03-22 RX ORDER — CHOLECALCIFEROL (VITAMIN D3) 25 MCG
CAPSULE ORAL
COMMUNITY
End: 2023-10-20

## 2021-03-22 RX ORDER — LEVOTHYROXINE SODIUM 137 UG/1
125 TABLET ORAL DAILY
COMMUNITY
End: 2023-11-01 | Stop reason: DRUGHIGH

## 2021-03-22 ASSESSMENT — MIFFLIN-ST. JEOR: SCORE: 1873.63

## 2021-03-22 ASSESSMENT — ANXIETY QUESTIONNAIRES
7. FEELING AFRAID AS IF SOMETHING AWFUL MIGHT HAPPEN: NOT AT ALL
1. FEELING NERVOUS, ANXIOUS, OR ON EDGE: NOT AT ALL
2. NOT BEING ABLE TO STOP OR CONTROL WORRYING: SEVERAL DAYS
IF YOU CHECKED OFF ANY PROBLEMS ON THIS QUESTIONNAIRE, HOW DIFFICULT HAVE THESE PROBLEMS MADE IT FOR YOU TO DO YOUR WORK, TAKE CARE OF THINGS AT HOME, OR GET ALONG WITH OTHER PEOPLE: NOT DIFFICULT AT ALL
5. BEING SO RESTLESS THAT IT IS HARD TO SIT STILL: NOT AT ALL
6. BECOMING EASILY ANNOYED OR IRRITABLE: NOT AT ALL

## 2021-03-22 ASSESSMENT — PATIENT HEALTH QUESTIONNAIRE - PHQ9
SUM OF ALL RESPONSES TO PHQ QUESTIONS 1-9: 3
5. POOR APPETITE OR OVEREATING: NOT AT ALL

## 2021-03-23 NOTE — RESULT ENCOUNTER NOTE
Vera      Your thyroid results are normal.  If further questions please give me a call.    Shahnaz Garcia RNC

## 2021-10-24 ENCOUNTER — HEALTH MAINTENANCE LETTER (OUTPATIENT)
Age: 56
End: 2021-10-24

## 2022-06-05 ENCOUNTER — HEALTH MAINTENANCE LETTER (OUTPATIENT)
Age: 57
End: 2022-06-05

## 2022-10-15 ENCOUNTER — HEALTH MAINTENANCE LETTER (OUTPATIENT)
Age: 57
End: 2022-10-15

## 2023-03-27 ENCOUNTER — OFFICE VISIT (OUTPATIENT)
Dept: FAMILY MEDICINE | Facility: CLINIC | Age: 58
End: 2023-03-27
Payer: COMMERCIAL

## 2023-03-27 VITALS
BODY MASS INDEX: 37.12 KG/M2 | DIASTOLIC BLOOD PRESSURE: 78 MMHG | SYSTOLIC BLOOD PRESSURE: 116 MMHG | HEIGHT: 69 IN | TEMPERATURE: 97.7 F | HEART RATE: 89 BPM | RESPIRATION RATE: 16 BRPM | OXYGEN SATURATION: 99 % | WEIGHT: 250.6 LBS

## 2023-03-27 DIAGNOSIS — R35.0 URINARY FREQUENCY: ICD-10-CM

## 2023-03-27 DIAGNOSIS — R14.0 BLOATING: Primary | ICD-10-CM

## 2023-03-27 DIAGNOSIS — E03.9 HYPOTHYROIDISM, UNSPECIFIED TYPE: ICD-10-CM

## 2023-03-27 DIAGNOSIS — R30.0 DYSURIA: ICD-10-CM

## 2023-03-27 DIAGNOSIS — R10.30 LOWER ABDOMINAL PAIN: ICD-10-CM

## 2023-03-27 LAB
ALBUMIN SERPL BCG-MCNC: 4.2 G/DL (ref 3.5–5.2)
ALBUMIN UR-MCNC: NEGATIVE MG/DL
ALP SERPL-CCNC: 99 U/L (ref 35–104)
ALT SERPL W P-5'-P-CCNC: 17 U/L (ref 10–35)
ANION GAP SERPL CALCULATED.3IONS-SCNC: 11 MMOL/L (ref 7–15)
APPEARANCE UR: ABNORMAL
AST SERPL W P-5'-P-CCNC: 14 U/L (ref 10–35)
BACTERIA #/AREA URNS HPF: ABNORMAL /HPF
BASOPHILS # BLD AUTO: 0.1 10E3/UL (ref 0–0.2)
BASOPHILS NFR BLD AUTO: 1 %
BILIRUB SERPL-MCNC: 0.7 MG/DL
BILIRUB UR QL STRIP: NEGATIVE
BUN SERPL-MCNC: 11.7 MG/DL (ref 8–23)
CALCIUM SERPL-MCNC: 9.2 MG/DL (ref 8.8–10.2)
CHLORIDE SERPL-SCNC: 104 MMOL/L (ref 98–107)
COLOR UR AUTO: YELLOW
CREAT SERPL-MCNC: 0.9 MG/DL (ref 0.51–0.95)
CRP SERPL-MCNC: 4.57 MG/L
DEPRECATED HCO3 PLAS-SCNC: 26 MMOL/L (ref 22–29)
EOSINOPHIL # BLD AUTO: 0.1 10E3/UL (ref 0–0.7)
EOSINOPHIL NFR BLD AUTO: 1 %
ERYTHROCYTE [DISTWIDTH] IN BLOOD BY AUTOMATED COUNT: 15.3 % (ref 10–15)
ERYTHROCYTE [SEDIMENTATION RATE] IN BLOOD BY WESTERGREN METHOD: 11 MM/HR (ref 0–30)
GFR SERPL CREATININE-BSD FRML MDRD: 73 ML/MIN/1.73M2
GLUCOSE SERPL-MCNC: 100 MG/DL (ref 70–99)
GLUCOSE UR STRIP-MCNC: NEGATIVE MG/DL
HCT VFR BLD AUTO: 46.5 % (ref 35–47)
HGB BLD-MCNC: 15.1 G/DL (ref 11.7–15.7)
HGB UR QL STRIP: ABNORMAL
IMM GRANULOCYTES # BLD: 0 10E3/UL
IMM GRANULOCYTES NFR BLD: 0 %
KETONES UR STRIP-MCNC: NEGATIVE MG/DL
LEUKOCYTE ESTERASE UR QL STRIP: ABNORMAL
LYMPHOCYTES # BLD AUTO: 1.3 10E3/UL (ref 0.8–5.3)
LYMPHOCYTES NFR BLD AUTO: 17 %
MCH RBC QN AUTO: 28.5 PG (ref 26.5–33)
MCHC RBC AUTO-ENTMCNC: 32.5 G/DL (ref 31.5–36.5)
MCV RBC AUTO: 88 FL (ref 78–100)
MONOCYTES # BLD AUTO: 0.4 10E3/UL (ref 0–1.3)
MONOCYTES NFR BLD AUTO: 6 %
NEUTROPHILS # BLD AUTO: 5.9 10E3/UL (ref 1.6–8.3)
NEUTROPHILS NFR BLD AUTO: 76 %
NITRATE UR QL: NEGATIVE
PH UR STRIP: 5.5 [PH] (ref 5–7)
PLATELET # BLD AUTO: 257 10E3/UL (ref 150–450)
POTASSIUM SERPL-SCNC: 4.1 MMOL/L (ref 3.4–5.3)
PROT SERPL-MCNC: 7.9 G/DL (ref 6.4–8.3)
RBC # BLD AUTO: 5.29 10E6/UL (ref 3.8–5.2)
RBC #/AREA URNS AUTO: ABNORMAL /HPF
SODIUM SERPL-SCNC: 141 MMOL/L (ref 136–145)
SP GR UR STRIP: >=1.03 (ref 1–1.03)
SQUAMOUS #/AREA URNS AUTO: ABNORMAL /LPF
UROBILINOGEN UR STRIP-ACNC: 0.2 E.U./DL
WBC # BLD AUTO: 7.8 10E3/UL (ref 4–11)
WBC #/AREA URNS AUTO: ABNORMAL /HPF

## 2023-03-27 PROCEDURE — 80050 GENERAL HEALTH PANEL: CPT | Performed by: INTERNAL MEDICINE

## 2023-03-27 PROCEDURE — 86140 C-REACTIVE PROTEIN: CPT | Performed by: INTERNAL MEDICINE

## 2023-03-27 PROCEDURE — 87086 URINE CULTURE/COLONY COUNT: CPT | Performed by: INTERNAL MEDICINE

## 2023-03-27 PROCEDURE — 85652 RBC SED RATE AUTOMATED: CPT | Performed by: INTERNAL MEDICINE

## 2023-03-27 PROCEDURE — 81001 URINALYSIS AUTO W/SCOPE: CPT | Performed by: INTERNAL MEDICINE

## 2023-03-27 PROCEDURE — 99215 OFFICE O/P EST HI 40 MIN: CPT | Performed by: INTERNAL MEDICINE

## 2023-03-27 PROCEDURE — 36415 COLL VENOUS BLD VENIPUNCTURE: CPT | Performed by: INTERNAL MEDICINE

## 2023-03-27 RX ORDER — CEPHALEXIN 500 MG/1
500 CAPSULE ORAL 3 TIMES DAILY
Qty: 21 CAPSULE | Refills: 0 | Status: SHIPPED | OUTPATIENT
Start: 2023-03-27 | End: 2023-11-01

## 2023-03-27 ASSESSMENT — PAIN SCALES - GENERAL: PAINLEVEL: WORST PAIN (10)

## 2023-03-27 NOTE — PROGRESS NOTES
Assessment & Plan   Problem List Items Addressed This Visit        Endocrine    Hypothyroidism    Relevant Orders    TSH with free T4 reflex   Other Visit Diagnoses     Bloating    -  Primary    Lower abdominal pain        Relevant Orders    CBC with platelets and differential (Completed)    Comprehensive metabolic panel (BMP + Alb, Alk Phos, ALT, AST, Total. Bili, TP) (Completed)    CRP, inflammation (Completed)    ESR: Erythrocyte sedimentation rate (Completed)    Dysuria        Relevant Medications    cephALEXin (KEFLEX) 500 MG capsule    Other Relevant Orders    Urine Culture Aerobic Bacterial - lab collect (Completed)    CBC with platelets and differential (Completed)    Comprehensive metabolic panel (BMP + Alb, Alk Phos, ALT, AST, Total. Bili, TP) (Completed)    CRP, inflammation (Completed)    UA with Microscopic reflex to Culture - lab collect (Completed)    UA Microscopic with Reflex to Culture (Completed)    Urinary frequency        Relevant Medications    cephALEXin (KEFLEX) 500 MG capsule    Other Relevant Orders    Urine Culture Aerobic Bacterial - lab collect (Completed)    CBC with platelets and differential (Completed)    Comprehensive metabolic panel (BMP + Alb, Alk Phos, ALT, AST, Total. Bili, TP) (Completed)    CRP, inflammation (Completed)    UA with Microscopic reflex to Culture - lab collect (Completed)    UA Microscopic with Reflex to Culture (Completed)         Keep well-hydrated, check some labs including urine analysis and culture, inflammatory markers, blood counts.  Hemodynamics are stable, abdominal exam is not an acute abdomen is benign, pending lab results further evaluation.  Discussed case with ADS provider if labs are abnormal and or increase inflammatory markers she will need further testing with CT imaging.  Patient notified of the plan,    Addendum lab results shows normal inflammatory markers and blood counts,  The Urinalysis suggestive of UTI.  Patient was started on Keflex  "antibiotics For 7 days pending urine culture results.  But she has some discomfort with ambulation in the abdomen.    Addendum patient did have hypothyroidism last checked was in 2021 we added TSH to her labs.         BMI:   Estimated body mass index is 37.01 kg/m  as calculated from the following:    Height as of this encounter: 1.753 m (5' 9\").    Weight as of this encounter: 113.7 kg (250 lb 9.6 oz).   Patient not sick or toxic looking      Danelle Currie MD  [facility]  Total time spent was 40 minutes review of records exam recommendation discussing case with ADS providers for care coordination  Kraig Waite is a 58 year old, presenting for the following health issues:  Urinary Problem (Lower abdominal pain, body chills, nausea x 1 week)  No flowsheet data found.  HPI     Patient presenting for evaluation of some urinary symptoms and GI symptoms associated.  Describes some frequency and dysuria no blood in the urine.  Describes some also ongoing lower abdominal pain bilateral/discomfort with some diarrhea.  Denies any vomiting no lethargy she felt some chills no flank pain systemic complaints.  No lethargy.    Review of Systems   Constitutional, HEENT, cardiovascular, pulmonary, gi and gu systems are negative, except as otherwise noted.      Objective    /78 (BP Location: Right arm, Patient Position: Sitting, Cuff Size: Adult Large)   Pulse 89   Temp 97.7  F (36.5  C) (Temporal)   Resp 16   Ht 1.753 m (5' 9\")   Wt 113.7 kg (250 lb 9.6 oz)   SpO2 99%   BMI 37.01 kg/m    Body mass index is 37.01 kg/m .  Physical Exam   GENERAL: healthy, alert and no distress  EYES: Eyes grossly normal to inspection, PERRL and conjunctivae and sclerae normal  RESP: lungs clear to auscultation - no rales, rhonchi or wheezes  CV: regular rate and rhythm, normal S1 S2, no S3 or S4, no murmur, click or rub, no peripheral edema and peripheral pulses strong  ABDOMEN: soft, mild tenderness on the " palpation bilateral lower abdomen, no peritoneal signs, no guarding, nondistended, no hepatosplenomegaly, no masses and bowel sounds normal  MS: no gross musculoskeletal defects noted, no edema  SKIN: no suspicious lesions or rashes  NEURO: Normal strength and tone, mentation intact and speech normal  PSYCH: mentation appears normal, affect normal/bright    Office Visit on 03/22/2021   Component Date Value Ref Range Status     TSH 03/22/2021 0.46  0.40 - 4.00 mU/L Final

## 2023-03-27 NOTE — RESULT ENCOUNTER NOTE
Guero Waite, I reviewed your labs, your labs may suggest that you have a urine infection, urinalysis shows inflammatory white cells, but the inflammatory markers in the blood are reassuring ; normal including CBC CRP and ESR, your CBC shows normal white blood cell count suggesting there is no systemic infection.  Chemistry shows normal electrolytes, normal kidney function test and liver enzymes.  Please keep on hydrating ;  please start on the antibiotic prescribed   we will wait for final urine culture.    If worsening of abdominal pain please schedule follow-up in the clinic or go to urgent care  Please be reassured with your blood lab results.  Dr Currie

## 2023-03-28 ENCOUNTER — TELEPHONE (OUTPATIENT)
Dept: FAMILY MEDICINE | Facility: CLINIC | Age: 58
End: 2023-03-28
Payer: COMMERCIAL

## 2023-03-28 LAB — TSH SERPL DL<=0.005 MIU/L-ACNC: 1.63 UIU/ML (ref 0.3–4.2)

## 2023-03-28 NOTE — TELEPHONE ENCOUNTER
Please see below, 1st attempt to reach patient documented in results tab.        Hernan Guzman RN  [facility]

## 2023-03-28 NOTE — TELEPHONE ENCOUNTER
----- Message from Danelle Currie MD sent at 3/27/2023  2:44 PM CDT -----  Guero Waite, I reviewed your labs, your labs may suggest that you have a urine infection, urinalysis shows inflammatory white cells, but the inflammatory markers in the blood are reassuring ; normal including CBC CRP and ESR, your CBC shows normal white blood cell count suggesting there is no systemic infection.  Chemistry shows normal electrolytes, normal kidney function test and liver enzymes.  Please keep on hydrating ;  please start on the antibiotic prescribed   we will wait for final urine culture.    If worsening of abdominal pain please schedule follow-up in the clinic or go to urgent care  Please be reassured with your blood lab results.  Dr Currie

## 2023-03-29 LAB — BACTERIA UR CULT: NORMAL

## 2023-03-29 NOTE — TELEPHONE ENCOUNTER
Patient returned call and had also seen the message in my chart.  Advised to call to triage lien 24/7 with any changes or other questions also.    Skylar Nash RN on 3/28/2023 at 8:20 PM

## 2023-06-11 ENCOUNTER — HEALTH MAINTENANCE LETTER (OUTPATIENT)
Age: 58
End: 2023-06-11

## 2023-10-20 ENCOUNTER — OFFICE VISIT (OUTPATIENT)
Dept: URGENT CARE | Facility: URGENT CARE | Age: 58
End: 2023-10-20
Payer: COMMERCIAL

## 2023-10-20 VITALS
DIASTOLIC BLOOD PRESSURE: 74 MMHG | RESPIRATION RATE: 14 BRPM | TEMPERATURE: 97.6 F | HEART RATE: 84 BPM | SYSTOLIC BLOOD PRESSURE: 128 MMHG | BODY MASS INDEX: 36.92 KG/M2 | WEIGHT: 250 LBS | OXYGEN SATURATION: 98 %

## 2023-10-20 DIAGNOSIS — R30.0 DYSURIA: Primary | ICD-10-CM

## 2023-10-20 DIAGNOSIS — L29.9 ITCHING: ICD-10-CM

## 2023-10-20 LAB
ALBUMIN UR-MCNC: NEGATIVE MG/DL
APPEARANCE UR: CLEAR
BILIRUB UR QL STRIP: NEGATIVE
CLUE CELLS: ABNORMAL
COLOR UR AUTO: YELLOW
GLUCOSE UR STRIP-MCNC: NEGATIVE MG/DL
HGB UR QL STRIP: NEGATIVE
KETONES UR STRIP-MCNC: NEGATIVE MG/DL
LEUKOCYTE ESTERASE UR QL STRIP: NEGATIVE
NITRATE UR QL: NEGATIVE
PH UR STRIP: 7 [PH] (ref 5–7)
SP GR UR STRIP: 1.01 (ref 1–1.03)
TRICHOMONAS, WET PREP: ABNORMAL
UROBILINOGEN UR STRIP-ACNC: 0.2 E.U./DL
WBC'S/HIGH POWER FIELD, WET PREP: ABNORMAL
YEAST, WET PREP: ABNORMAL

## 2023-10-20 PROCEDURE — 99213 OFFICE O/P EST LOW 20 MIN: CPT | Performed by: PHYSICIAN ASSISTANT

## 2023-10-20 PROCEDURE — 81003 URINALYSIS AUTO W/O SCOPE: CPT | Performed by: PHYSICIAN ASSISTANT

## 2023-10-20 PROCEDURE — 87210 SMEAR WET MOUNT SALINE/INK: CPT | Performed by: PHYSICIAN ASSISTANT

## 2023-10-20 RX ORDER — PHENAZOPYRIDINE HYDROCHLORIDE 200 MG/1
200 TABLET, FILM COATED ORAL 3 TIMES DAILY PRN
Qty: 15 TABLET | Refills: 0 | Status: SHIPPED | OUTPATIENT
Start: 2023-10-20 | End: 2023-11-01

## 2023-10-20 ASSESSMENT — ENCOUNTER SYMPTOMS
HEMATURIA: 0
DIARRHEA: 0
FEVER: 0
CONSTIPATION: 0
FLANK PAIN: 1
ABDOMINAL PAIN: 1
DYSURIA: 1
FREQUENCY: 1
CHILLS: 1
VOMITING: 0

## 2023-10-20 NOTE — PROGRESS NOTES
Assessment & Plan:        ICD-10-CM    1. Dysuria  R30.0 UA Macroscopic with reflex to Microscopic and Culture - Clinic Collect     phenazopyridine (PYRIDIUM) 200 MG tablet      2. Itching  L29.9 Wet prep - lab collect     Wet prep - lab collect            Plan/Clinical Decision Making:    Patient with acute dysuria, frequency, and vaginal irritation for several days.   Negative UA, normal exam. Negative wet prep.   Discussed using pyridium for symptoms. Avoid soaps, detergents in genital area.       Return if symptoms worsen or fail to improve, for in 3-5 days.     At the end of the encounter, I discussed results, diagnosis, medications. Discussed red flags for immediate return to clinic/ER, as well as indications for follow up if no improvement. Patient understood and agreed to plan. Patient was stable for discharge.        Eileen Flores PA-C on 10/20/2023 at 3:49 PM          Subjective:     HPI:    Mariann is a 58 year old female who presents to clinic today for the following health issues:  Chief Complaint   Patient presents with    UTI     Yeast infection x 2-3 days -- itching, discharge, odor, chills, burning, pain, frequency  -drinking more water and advil     HPI    Patient complains of several days with urinary symptoms with frequency, dysuria and vaginal irritation.   Has urine odor, no vaginal order.   No GI symptoms.     Had suspected UTI in April- negative culture.   Review of Systems   Constitutional:  Positive for chills. Negative for fever.   Gastrointestinal:  Positive for abdominal pain (lower pelvic area). Negative for constipation, diarrhea and vomiting.   Genitourinary:  Positive for dysuria, flank pain (mild) and frequency. Negative for hematuria.         Patient Active Problem List   Diagnosis    Hypothyroidism    Screening for cervical cancer        Past Medical History:   Diagnosis Date    Anxiety     Depressive disorder     Fatigue     Thyroid disease        Social History     Tobacco Use     Smoking status: Never    Smokeless tobacco: Never   Substance Use Topics    Alcohol use: Yes     Alcohol/week: 10.0 standard drinks of alcohol     Comment: wine             Objective:     Vitals:    10/20/23 1539   BP: 128/74   BP Location: Right arm   Patient Position: Chair   Cuff Size: Adult Regular   Pulse: 84   Resp: 14   Temp: 97.6  F (36.4  C)   TempSrc: Oral   SpO2: 98%   Weight: 113.4 kg (250 lb)         Physical Exam   EXAM:   Pleasant, alert, appropriate appearance. NAD.  Head Exam: Normocephalic, atraumatic.  ABD: soft, Non-tender,  no rebound/guarding.  No masses/organomegaly.  Ext/musculoskeletal:  No CVA tenderness.   : no erythema, external rashes. No vaginal discharge seen.         Results:  Results for orders placed or performed in visit on 10/20/23   UA Macroscopic with reflex to Microscopic and Culture - Clinic Collect     Status: Normal    Specimen: Urine, Clean Catch   Result Value Ref Range    Color Urine Yellow Colorless, Straw, Light Yellow, Yellow    Appearance Urine Clear Clear    Glucose Urine Negative Negative mg/dL    Bilirubin Urine Negative Negative    Ketones Urine Negative Negative mg/dL    Specific Gravity Urine 1.010 1.003 - 1.035    Blood Urine Negative Negative    pH Urine 7.0 5.0 - 7.0    Protein Albumin Urine Negative Negative mg/dL    Urobilinogen Urine 0.2 0.2, 1.0 E.U./dL    Nitrite Urine Negative Negative    Leukocyte Esterase Urine Negative Negative    Narrative    Microscopic not indicated   Wet prep - lab collect     Status: Abnormal    Specimen: Vagina; Swab   Result Value Ref Range    Trichomonas Absent Absent    Yeast Absent Absent    Clue Cells Absent Absent    WBCs/high power field 1+ (A) None

## 2023-10-30 RX ORDER — LEVOTHYROXINE SODIUM 112 UG/1
112 TABLET ORAL
COMMUNITY
Start: 2023-09-12

## 2023-10-30 RX ORDER — LEVOTHYROXINE SODIUM 125 UG/1
125 TABLET ORAL
COMMUNITY
Start: 2023-08-14 | End: 2023-11-01 | Stop reason: DRUGHIGH

## 2023-10-30 NOTE — PROGRESS NOTES
Mariann is a 58 year old  female who presents for annual exam.     Besides routine health maintenance, she has no other health concerns today .    HPI: here for annual exam.  She sees a endocrinologist for thyroid and just had medication adjusted.  She is c/o fatigue, tries to exercise some, but just too tired.  Has gained weight, denies any depression.  Still working.  Had a LASH at age 32.   Mammogram today.   Blood work with endocrinologist.          GYNECOLOGIC HISTORY:    No LMP recorded. Patient has had a hysterectomy.  Her current contraception method is: hysterectomy.  She  reports that she has never smoked. She has never used smokeless tobacco.  Patient is not sexually active.  STD testing offered?  Declined  Last PHQ-9 score on record =       2023     2:57 PM   PHQ-9 SCORE   PHQ-9 Total Score 0     Last GAD7 score on record =       2023     2:57 PM   GHANSHYAM-7 SCORE   Total Score 0     Alcohol Score = 0    HEALTH MAINTENANCE:  Cholesterol:   Cholesterol   Date Value Ref Range Status   2020 172 <200 mg/dL Final   2014 149 mg/dL Final      Last Mammo:  2021 , Result: Normal, Next Mammo: 2023 (today)  Pap:   Lab Results   Component Value Date    PAP NIL, Neg HPV 2020    PAP NIL 2018    PAP NIL 2017      Colonoscopy:  2016, Result: Normal, Next Colonoscopy: 10 years.  Dexa:  2020    Health maintenance reviewed:  yes    Overdue          Never done ADVANCE CARE PLANNING (Every 5 Years)     Never done MIGRAINE ACTION PLAN (Once)     Never done COVID-19 Vaccine (1)     Never done HIV SCREENING (Once)     Never done HEPATITIS C SCREENING (Once)     Never done ZOSTER IMMUNIZATION (1 of 2)     DEC 17   2020 COLORECTAL CANCER SCREENING (COLONOSCOPY - Preferred) (Every 10 Years)  Last completed: Dec 17, 2010     MAR 22   2022 MAMMO SCREENING (Yearly)   Scheduled for: 2023     MAY 8   2022 DTAP/TDAP/TD IMMUNIZATION (3 - Td or Tdap)  Last completed:  "May 8, 2012     Never done RSV VACCINE 60+ (1 - 1-dose 60+ series)     Never done INFLUENZA VACCINE (1)       HISTORY:  OB History    Para Term  AB Living   2 2 2 0 0 2   SAB IAB Ectopic Multiple Live Births   0 0 0 0 2      # Outcome Date GA Lbr Brando/2nd Weight Sex Delivery Anes PTL Lv   2 Term         IRINA   1 Term         IRINA       Patient Active Problem List   Diagnosis    Hypothyroidism    Screening for cervical cancer    Hypothyroidism due to acquired atrophy of thyroid    Routine general medical examination at a health care facility     Past Surgical History:   Procedure Laterality Date    C LAPAROSCOPIC SUPRACERVICAL HYSTERECTOMY (SUBTOTAL HYSTERECTOMY), WITH OR  age 32    subtotal hysterectomy    HC THYROID LOBECTOMY,UNILAT  age 37      Social History     Tobacco Use    Smoking status: Never    Smokeless tobacco: Never   Substance Use Topics    Alcohol use: Yes     Alcohol/week: 10.0 standard drinks of alcohol     Comment: wine      Problem (# of Occurrences) Relation (Name,Age of Onset)    Cardiovascular (1) Mother: CHF    Diabetes (1) Father: type 2    Hypertension (1) Mother    Neurologic Disorder (1) Father: Parkinson              Current Outpatient Medications   Medication Sig    levothyroxine (SYNTHROID/LEVOTHROID) 112 MCG tablet Take 112 mcg by mouth daily at 2 pm    Multiple Vitamins-Minerals (MULTIVITAMIN PO)      No current facility-administered medications for this visit.     Allergies   Allergen Reactions    Sulfa Antibiotics        Past medical, surgical, social and family histories were reviewed and updated in EPIC.    ROS:     No urinary frequency or dysuria, bladder or kidney problems    EXAM:  /82   Ht 1.753 m (5' 9\")   Wt 120.2 kg (265 lb)   BMI 39.13 kg/m     BMI: Body mass index is 39.13 kg/m .    PHYSICAL EXAM:  Constitutional:   Appearance: Well nourished, well developed, alert, in no acute distress  Neck:  Lymph Nodes:  No lymphadenopathy present    Thyroid:  " Gland size normal, nontender, no nodules or masses present  on palpation  Chest:  Respiratory Effort:  Breathing unlabored  Cardiovascular:    Heart: Auscultation:  Regular rate, normal rhythm, no murmurs present  Breasts: Inspection of Breasts:  No lymphadenopathy present., Palpation of Breasts and Axillae:  No masses present on palpation, no breast tenderness., Axillary Lymph Nodes:  No lymphadenopathy present., and No nodularity, asymmetry or nipple discharge bilaterally.  Gastrointestinal:   Abdominal Examination:  Abdomen nontender to palpation, tone normal without rigidity or guarding, no masses present, umbilicus without lesions   Liver and Spleen:  No hepatomegaly present, liver nontender to palpation    Hernias:  No hernias present  Lymphatic: Lymph Nodes:  No other lymphadenopathy present  Skin:  General Inspection:  No rashes present, no lesions present, no areas of  discoloration  Neurologic:    Mental Status:  Oriented X3.  Normal strength and tone, sensory exam                grossly normal, mentation intact and speech normal.    Psychiatric:   Mentation appears normal and affect normal/bright.         Pelvic Exam:  External Genitalia:     Normal appearance for age, no discharge present, no tenderness present, no inflammatory lesions present, color normal  Vagina:     Normal vaginal vault without central or paravaginal defects, no discharge present, no inflammatory lesions present, no masses present  Bladder:     Nontender to palpation  Urethra:   Urethral Body:  Urethra palpation normal, urethra structural support normal   Urethral Meatus:  No erythema or lesions present  Cervix:     Appearance healthy, no lesions present, nontender to palpation, no bleeding present  Uterus:     Surgically absent  Adnexa:     No adnexal tenderness present, no adnexal masses present  Perineum:     Perineum within normal limits, no evidence of trauma, no rashes or skin lesions present  Anus:     Anus within normal  limits, no hemorrhoids present  Inguinal Lymph Nodes:     No lymphadenopathy present  Pubic Hair:     Normal pubic hair distribution for age  Genitalia and Groin:     No rashes present, no lesions present, no areas of discoloration, no masses present    COUNSELING:   Reviewed preventive health counseling, as reflected in patient instructions       Regular exercise       Healthy diet/nutrition       Osteoporosis prevention/bone health       Colorectal Cancer Screening       (Tori)menopause management    BMI: Body mass index is 39.13 kg/m .  Weight management plan: Discussed healthy diet and exercise guidelines    ASSESSMENT:  58 year old female with satisfactory annual exam.    ICD-10-CM    1. Encounter for gynecological examination without abnormal finding  Z01.419 Pap thin layer screen with HPV - recommended age 30 - 65 years      2. Other fatigue  R53.83 Vitamin D Deficiency          PLAN:  Normal Gyn exam.  Will check vitamin D level.  Also discussed fatigue could be from thyroid.  Referral to 24 week [facility] program for weight loss.  Return prn or 1 year for annual.      KATY Jarrett CNP    Answers submitted by the patient for this visit:  Annual Preventive Visit (Submitted on 11/1/2023)  Chief Complaint: Annual Exam:  Frequency of exercise:: None  Getting at least 3 servings of Calcium per day:: Yes  Diet:: Other  Taking medications regularly:: Yes  Medication side effects:: Not applicable  Bi-annual eye exam:: Yes  Dental care twice a year:: Yes  Sleep apnea or symptoms of sleep apnea:: None  abdominal pain: No  Blood in stool: No  Blood in urine: No  chest pain: No  chills: No  congestion: No  constipation: No  cough: No  diarrhea: No  dizziness: No  ear pain: No  eye pain: No  nervous/anxious: No  fever: No  frequency: No  genital sores: No  headaches: No  hearing loss: No  heartburn: No  arthralgias: No  joint swelling: No  peripheral edema: No  mood changes: No  myalgias: No  nausea:  No  dysuria: No  palpitations: No  Skin sensation changes: No  sore throat: No  urgency: No  rash: No  shortness of breath: No  visual disturbance: No  weakness: No  pelvic pain: No  vaginal bleeding: No  vaginal discharge: No  tenderness: No  breast mass: No  breast discharge: No  Additional concerns today:: No

## 2023-11-01 ENCOUNTER — ANCILLARY PROCEDURE (OUTPATIENT)
Dept: MAMMOGRAPHY | Facility: CLINIC | Age: 58
End: 2023-11-01
Attending: NURSE PRACTITIONER
Payer: COMMERCIAL

## 2023-11-01 ENCOUNTER — OFFICE VISIT (OUTPATIENT)
Dept: OBGYN | Facility: CLINIC | Age: 58
End: 2023-11-01
Attending: NURSE PRACTITIONER
Payer: COMMERCIAL

## 2023-11-01 VITALS
WEIGHT: 265 LBS | DIASTOLIC BLOOD PRESSURE: 82 MMHG | SYSTOLIC BLOOD PRESSURE: 126 MMHG | BODY MASS INDEX: 39.25 KG/M2 | HEIGHT: 69 IN

## 2023-11-01 DIAGNOSIS — Z12.31 VISIT FOR SCREENING MAMMOGRAM: ICD-10-CM

## 2023-11-01 DIAGNOSIS — R53.83 OTHER FATIGUE: ICD-10-CM

## 2023-11-01 DIAGNOSIS — Z01.419 ENCOUNTER FOR GYNECOLOGICAL EXAMINATION WITHOUT ABNORMAL FINDING: Primary | ICD-10-CM

## 2023-11-01 PROCEDURE — 99396 PREV VISIT EST AGE 40-64: CPT | Performed by: NURSE PRACTITIONER

## 2023-11-01 PROCEDURE — 77067 SCR MAMMO BI INCL CAD: CPT | Mod: TC | Performed by: RADIOLOGY

## 2023-11-01 PROCEDURE — 82306 VITAMIN D 25 HYDROXY: CPT | Performed by: NURSE PRACTITIONER

## 2023-11-01 PROCEDURE — G0145 SCR C/V CYTO,THINLAYER,RESCR: HCPCS | Performed by: NURSE PRACTITIONER

## 2023-11-01 PROCEDURE — 99213 OFFICE O/P EST LOW 20 MIN: CPT | Mod: 25 | Performed by: NURSE PRACTITIONER

## 2023-11-01 PROCEDURE — 87624 HPV HI-RISK TYP POOLED RSLT: CPT | Performed by: NURSE PRACTITIONER

## 2023-11-01 PROCEDURE — 36415 COLL VENOUS BLD VENIPUNCTURE: CPT | Performed by: NURSE PRACTITIONER

## 2023-11-01 ASSESSMENT — ENCOUNTER SYMPTOMS
NAUSEA: 0
SHORTNESS OF BREATH: 0
CHILLS: 0
FREQUENCY: 0
HEMATOCHEZIA: 0
JOINT SWELLING: 0
NERVOUS/ANXIOUS: 0
HEADACHES: 0
DYSURIA: 0
HEMATURIA: 0
SORE THROAT: 0
PARESTHESIAS: 0
FEVER: 0
BREAST MASS: 0
WEAKNESS: 0
ABDOMINAL PAIN: 0
CONSTIPATION: 0
HEARTBURN: 0
COUGH: 0
DIZZINESS: 0
MYALGIAS: 0
PALPITATIONS: 0
EYE PAIN: 0
ARTHRALGIAS: 0
DIARRHEA: 0

## 2023-11-01 ASSESSMENT — ANXIETY QUESTIONNAIRES
1. FEELING NERVOUS, ANXIOUS, OR ON EDGE: NOT AT ALL
3. WORRYING TOO MUCH ABOUT DIFFERENT THINGS: NOT AT ALL
5. BEING SO RESTLESS THAT IT IS HARD TO SIT STILL: NOT AT ALL
2. NOT BEING ABLE TO STOP OR CONTROL WORRYING: NOT AT ALL
6. BECOMING EASILY ANNOYED OR IRRITABLE: NOT AT ALL
IF YOU CHECKED OFF ANY PROBLEMS ON THIS QUESTIONNAIRE, HOW DIFFICULT HAVE THESE PROBLEMS MADE IT FOR YOU TO DO YOUR WORK, TAKE CARE OF THINGS AT HOME, OR GET ALONG WITH OTHER PEOPLE: NOT DIFFICULT AT ALL
GAD7 TOTAL SCORE: 0
7. FEELING AFRAID AS IF SOMETHING AWFUL MIGHT HAPPEN: NOT AT ALL
GAD7 TOTAL SCORE: 0

## 2023-11-01 ASSESSMENT — PATIENT HEALTH QUESTIONNAIRE - PHQ9
SUM OF ALL RESPONSES TO PHQ QUESTIONS 1-9: 0
5. POOR APPETITE OR OVEREATING: NOT AT ALL

## 2023-11-02 LAB — VIT D+METAB SERPL-MCNC: 32 NG/ML (ref 20–50)

## 2023-11-02 NOTE — RESULT ENCOUNTER NOTE
Vera      Your vitamin D results are normal.  If further questions please give me a call.    Shahnaz Garcia RNC

## 2023-11-06 LAB
BKR LAB AP GYN ADEQUACY: NORMAL
BKR LAB AP GYN INTERPRETATION: NORMAL
BKR LAB AP HPV REFLEX: NORMAL
BKR LAB AP PREVIOUS ABNORMAL: NORMAL
PATH REPORT.COMMENTS IMP SPEC: NORMAL
PATH REPORT.COMMENTS IMP SPEC: NORMAL
PATH REPORT.RELEVANT HX SPEC: NORMAL

## 2023-11-08 LAB
HUMAN PAPILLOMA VIRUS 16 DNA: NEGATIVE
HUMAN PAPILLOMA VIRUS 18 DNA: NEGATIVE
HUMAN PAPILLOMA VIRUS FINAL DIAGNOSIS: NORMAL
HUMAN PAPILLOMA VIRUS OTHER HR: NEGATIVE

## 2024-10-02 ENCOUNTER — PATIENT OUTREACH (OUTPATIENT)
Dept: CARE COORDINATION | Facility: CLINIC | Age: 59
End: 2024-10-02
Payer: COMMERCIAL

## 2024-10-16 ENCOUNTER — PATIENT OUTREACH (OUTPATIENT)
Dept: CARE COORDINATION | Facility: CLINIC | Age: 59
End: 2024-10-16
Payer: COMMERCIAL

## 2024-10-30 ENCOUNTER — PATIENT OUTREACH (OUTPATIENT)
Dept: CARE COORDINATION | Facility: CLINIC | Age: 59
End: 2024-10-30
Payer: COMMERCIAL

## 2024-12-21 ENCOUNTER — HEALTH MAINTENANCE LETTER (OUTPATIENT)
Age: 59
End: 2024-12-21

## 2025-01-25 ENCOUNTER — HEALTH MAINTENANCE LETTER (OUTPATIENT)
Age: 60
End: 2025-01-25